# Patient Record
Sex: FEMALE | Race: BLACK OR AFRICAN AMERICAN | NOT HISPANIC OR LATINO | Employment: UNEMPLOYED | URBAN - METROPOLITAN AREA
[De-identification: names, ages, dates, MRNs, and addresses within clinical notes are randomized per-mention and may not be internally consistent; named-entity substitution may affect disease eponyms.]

---

## 2018-12-18 DIAGNOSIS — Z36.89 ENCOUNTER FOR FETAL ANATOMIC SURVEY: Primary | ICD-10-CM

## 2018-12-18 DIAGNOSIS — O35.9XX0 SUSPECTED FETAL ABNORMALITY AFFECTING MANAGEMENT OF MOTHER, SINGLE OR UNSPECIFIED FETUS: ICD-10-CM

## 2018-12-19 ENCOUNTER — OFFICE VISIT (OUTPATIENT)
Dept: MATERNAL FETAL MEDICINE | Facility: CLINIC | Age: 33
End: 2018-12-19
Payer: MEDICAID

## 2018-12-19 VITALS
HEIGHT: 59 IN | DIASTOLIC BLOOD PRESSURE: 64 MMHG | SYSTOLIC BLOOD PRESSURE: 108 MMHG | WEIGHT: 202 LBS | BODY MASS INDEX: 40.72 KG/M2

## 2018-12-19 DIAGNOSIS — O35.06X0 FETAL HYDROCEPHALY AFFECTING ANTEPARTUM CARE OF MOTHER, SINGLE OR UNSPECIFIED FETUS: ICD-10-CM

## 2018-12-19 DIAGNOSIS — O35.9XX0 SUSPECTED FETAL ABNORMALITY AFFECTING MANAGEMENT OF MOTHER, SINGLE OR UNSPECIFIED FETUS: ICD-10-CM

## 2018-12-19 DIAGNOSIS — Z36.89 ENCOUNTER FOR FETAL ANATOMIC SURVEY: ICD-10-CM

## 2018-12-19 DIAGNOSIS — Q02 MICROCEPHALY: ICD-10-CM

## 2018-12-19 PROCEDURE — 76819 FETAL BIOPHYS PROFIL W/O NST: CPT | Mod: ,,, | Performed by: OBSTETRICS & GYNECOLOGY

## 2018-12-19 PROCEDURE — 76811 OB US DETAILED SNGL FETUS: CPT | Mod: ,,, | Performed by: OBSTETRICS & GYNECOLOGY

## 2018-12-19 PROCEDURE — 99203 OFFICE O/P NEW LOW 30 MIN: CPT | Mod: TH,25,, | Performed by: OBSTETRICS & GYNECOLOGY

## 2018-12-19 NOTE — LETTER
December 19, 2018      Isaac Saxena MD  [id number]   [address] [place] [address]           [facility]  [address] , [address]  [place] [address]  Phone: [phone]          Patient: Liss Sewell   MR Number: [id number]   YOB: 1985   Date of Visit: 12/19/2018       Dear Dr. Isaac Saxena:    Thank you for referring Liss Sewell to me for evaluation. Attached you will find relevant portions of my assessment and plan of care.    If you have questions, please do not hesitate to call me. I look forward to following Liss Sewell along with you.    Sincerely,    Ben Sena MD    Enclosure  CC:  No Recipients    If you would like to receive this communication electronically, please contact [email or web address] or [phone] to request more information on kaleo Link access.    For providers and/or their staff who would like to refer a patient to [facility], please contact us through our one-stop-shop provider referral line, [facility], at [phone].    If you feel you have received this communication in error or would no longer like to receive these types of communications, please e-mail [email or web address]

## 2018-12-19 NOTE — PROGRESS NOTES
Chief complaint: Suspected microcephaly    Provider requesting consultation: Dr. Saxena    33 y.o. [id number] 36w1d EGA.    PMH:History reviewed. No pertinent past medical history.    PObHx:  OB History    Para Term  AB Living   3 1 1 0 1 1   SAB TAB Ectopic Multiple Live Births   1 0 0 0 1      # Outcome Date GA Lbr Ag/2nd Weight Sex Delivery Anes PTL Lv   3 Current            2 SAB  16w0d             Birth Comments: Reports going to the hosptial because of bleeding was not aware that she was pregnant   1 Term                   PSH:  Past Surgical History:   Procedure Laterality Date     SECTION  2017    FTP       Family history:family history is not on file.    Social history: reports that  has never smoked. she has never used smokeless tobacco. She reports that she does not drink alcohol or use drugs.    A detailed fetal anatomical ultrasound was completed today.  See details in imaging section of EPIC.    We discussed that hydrocephalus can be associated with chromosome abnormalities, viral infections, other genetic conditions, neural tube defects and other structural abnormalities. Chromosomes and aneuploidy were discussed in detail. The most common chromosome abnormality seen is typically Down syndrome.    'Ne discussed that there are many other genetic syndromes that cannot be diagnosed on ultrasound and that the fetus would need to be evaluated after birth. A thorough evaluation of the baby is recommended postnatally. This fetus is noted to be male. There is a genetic condition known as X-linked hydrocephalus that should be ruled out if no other diagnosis is established postnatally.     A neural tube defect is not noted on ultrasound. An obstruction in the brain is also a possibility. The baby should have an MRI  to rule out an obstruction. There is no reported family history of chromosome abnormalities, birth defects, mental retardation, multiple miscarriages or other  inherited conditions. Consanguinity was denied.    We discussed that individuals with hydrocephalus can have a normal outcome, however, the risk is increased for developmental delay, mental retardation, and seizures. Outcome is dependent on associated abnormalities and the underlying cause for the hydrocephalus.    Viral infections can also be screened for through polymerase chain reaction performed on amniotic fluid. The risks, benefits, and limitations of amniocentesis were discussed. There is a less than 1 in 1000 risk for complications following amniocentesis which at this GA would be PPROM and labor instead of fetal damage. These risks include vaginal leakage of fluid, vaginal bleeding, infection, rupture of membranes,, and damage to the cord or fetus. Results are approximately 99.9% accurate and are generally available within 10-14 days.    In addition, the fetus also evidences microcephaly, an unusual combination.  The absence of other anomalies could not be fully evaluated due to the limitations imposed by advanced GA.  The chance of this being 2/2a genetic or syndromic abnormality is very high.      She elected to proceed with amniocentesis and this will be scheduled. I also scheduled an MRI to better assess any other CNS abnormalities.     The patient was given an opportunity to ask questions about management and the diease process.  She expressed an understanding of and agreement to the above impression and plan. All questions were answered to her satisfaction.  She was given contact information to the [facility] clinic to address further concerns.      The approximate physician face-to-face time was 30 minutes. The majority of the time (>50%) was spent on counseling of the patient or coordination of care.

## 2018-12-20 DIAGNOSIS — O35.9XX0 KNOWN FETAL ANOMALY, ANTEPARTUM, SINGLE OR UNSPECIFIED FETUS: Primary | ICD-10-CM

## 2018-12-26 ENCOUNTER — HOSPITAL ENCOUNTER (OUTPATIENT)
Dept: RADIOLOGY | Facility: OTHER | Age: 33
Discharge: HOME | End: 2018-12-26
Attending: OBSTETRICS & GYNECOLOGY
Payer: MEDICAID

## 2018-12-26 ENCOUNTER — PROCEDURE VISIT (OUTPATIENT)
Dept: MATERNAL FETAL MEDICINE | Facility: CLINIC | Age: 33
End: 2018-12-26
Payer: MEDICAID

## 2018-12-26 ENCOUNTER — INITIAL CONSULT (OUTPATIENT)
Dept: MATERNAL FETAL MEDICINE | Facility: CLINIC | Age: 33
End: 2018-12-26
Payer: MEDICAID

## 2018-12-26 VITALS
DIASTOLIC BLOOD PRESSURE: 60 MMHG | SYSTOLIC BLOOD PRESSURE: 118 MMHG | WEIGHT: 203.25 LBS | BODY MASS INDEX: 41.05 KG/M2

## 2018-12-26 DIAGNOSIS — O35.06X0 FETAL HYDROCEPHALY AFFECTING ANTEPARTUM CARE OF MOTHER, SINGLE OR UNSPECIFIED FETUS: ICD-10-CM

## 2018-12-26 DIAGNOSIS — O35.9XX0 KNOWN FETAL ANOMALY, ANTEPARTUM, SINGLE OR UNSPECIFIED FETUS: ICD-10-CM

## 2018-12-26 PROCEDURE — 99215 OFFICE O/P EST HI 40 MIN: CPT | Mod: S$PBB,TH,25, | Performed by: OBSTETRICS & GYNECOLOGY

## 2018-12-26 PROCEDURE — 99999 PR PBB SHADOW E&M-EST. PATIENT-LVL II: CPT | Mod: PBBFAC,,, | Performed by: OBSTETRICS & GYNECOLOGY

## 2018-12-26 PROCEDURE — 76817 TRANSVAGINAL US OBSTETRIC: CPT | Mod: PBBFAC | Performed by: OBSTETRICS & GYNECOLOGY

## 2018-12-26 PROCEDURE — 74712 MRI FETAL SNGL/1ST GESTATION: CPT | Mod: 26,,, | Performed by: RADIOLOGY

## 2018-12-26 PROCEDURE — 74712 MRI FETAL SNGL/1ST GESTATION: CPT | Mod: TC

## 2018-12-26 PROCEDURE — 76816 OB US FOLLOW-UP PER FETUS: CPT | Mod: 26,S$PBB,, | Performed by: OBSTETRICS & GYNECOLOGY

## 2018-12-26 PROCEDURE — 99212 OFFICE O/P EST SF 10 MIN: CPT | Mod: PBBFAC,25,TH | Performed by: OBSTETRICS & GYNECOLOGY

## 2018-12-26 PROCEDURE — 76817 TRANSVAGINAL US OBSTETRIC: CPT | Mod: 26,S$PBB,, | Performed by: OBSTETRICS & GYNECOLOGY

## 2018-12-26 PROCEDURE — 76816 OB US FOLLOW-UP PER FETUS: CPT | Mod: PBBFAC | Performed by: OBSTETRICS & GYNECOLOGY

## 2018-12-27 ENCOUNTER — TELEPHONE (OUTPATIENT)
Dept: MATERNAL FETAL MEDICINE | Facility: CLINIC | Age: 33
End: 2018-12-27

## 2018-12-27 NOTE — TELEPHONE ENCOUNTER
After talking to Dr. Saxena, Dr. Castillo states that both her and Dr. Saxena are okay with patient's repeat  between  through .    Nurse phoned patient and reviewed the above information. Patient states she is okay with delivery on 2019 at 7:00 am.    Nurse instructed patient to remain NPO starting at midnight. Patient is coming from [place], [place] and is instructed to arrive to [facility] 6th floor L&D around 5:30 am.    Patient verbalized understanding of all information received.    Copy of  instructions printed out and mailed to patient's address on file.

## 2018-12-27 NOTE — PROGRESS NOTES
"OB Ultrasound Report (see PDF version under imaging tab) and f/u consult note    Indication  ========    Follow Up Consultation: Amnio/Fetal Head; Ventriculomegaly; Microcephaly.    History  ======    Previous Outcomes   3  Para 1    Pregnancy History  ===============    Maternal Lab Tests  Genetic screening declined.      Maternal Assessment  ================    Weight 92 kg  Weight (lb) 203 lb  BP syst 118 mmHg  BP diast 60 mmHg    Method  ======    Transvaginal ultrasound examination, Transabdominal ultrasound examination, 2D Color Doppler, Voluson E10. View: Suboptimal view:  limited by late gestational age.    Pregnancy  =========    Guerrier pregnancy. Number of fetuses: 1.    Dating  ======    GA by "stated dating" 37 w + 1 d  ROSEMARY by "stated dating": 1/15/2019  Ultrasound examination on: 2018  GA by U/S based upon: BPD, HC  GA by U/S 25 w + 6 d  ROSEMARY by U/S: 2019  Assigned: Dating performed on 2018, based on the external assessment  Assigned GA 37 w + 1 d  Assigned ROSEMARY: 1/15/2019    General Evaluation  ===============    Cardiac activity: present.  bpm.  Fetal movements: visualized.  Presentation: cephalic.  Placenta:  Placental site: anterior, right.  Umbilical cord: Cord vessels: 3 vessel cord.  Amniotic fluid: MVP 8.5 cm. OLEG 16.4 cm. Q1 8.0 cm, Q2 0.0 cm, Q3 4.2 cm, Q4 4.2 cm.    Biophysical Profile  ===============    2: Fetal breathing movements  2: Gross body movements  2: Fetal tone  2: Amniotic fluid volume  : Biophysical profile score    Fetal Biometry  ===========    Fetal Biometry  BPD 66.2 mm 26w 5d Hadlock  OFD 75.9 mm 25w 0d Lizzy  .7 mm 24w 6d Hadlock  Calculated by: Hadlock (BPD-HC-AC-FL)  Cephalic index 0.87  MVP 8.5 cm  OLEG 16.4 cm   bpm  Head / Face / Neck   17.6 mm  Inner IOD 13.5 mm  Outer IOD 44.90 mm 28w 2d Lizzy  Rt Orbit 15.7 mm  Lt Orbit 13.5 mm    Fetal Anatomy  ===========    Lateral ventricles: details  Lt lateral ventricle: 17.6 " mm  Stomach: visualized  Bladder: visualized  Gender: female  Wants to know gender: yes    Consultation  ==========    Type: Abnormal Ultrasound Finding.  I spent 45 minutes on the consultation today with the patient, her boyfriend, her mother and her mother's  regarding findings  from today's ultrasound exam. More than 50% of the consultation was spent in face-to-face counseling and coordination of care.  Referring MD: Dr. Isaac Saxena    We reviewed the finding of severe fetal microcephaly and cerebral lateral ventricular dilation seen on today's exam and the last exam  on 18. Although imaging of the intracranial anatomy is limited, this degree of microcephaly is predictive of very poor  neurodevelopmental outcome, regardless of etiology. The possible causes for the microcephaly, namely genetic/chromosomal  abnormalities, congenital infection, in utero cerebral vascular disruption/injury, were reviewed. Options for further prenatal evaluation  of severe microcephaly were also discussed. Fetal MRI is planned for later today and should provide better imaging of the intracranial  anatomy and may help to arrive at an etiology. While amniocentesis can be done to assess for chromosomal abnormalities, due to late  gestational age and the limitations in prenatal ascertainment of abnormalities beyond the level determined by chromosomal microarray,   genetic assessment is more likely to be fruitful in terms of obtaining a comprehensive genetic diagnosis. Additonally, due to  late gestational age, results from amniocentesis are unlikely to be available prior to birth. Therefore, after our discussion, the patient  and her family decided not to pursue amniocentesis.    The patient denies any illnesses during pregnancy other than a UTI (GBS,treated with ampicillin), and she denies involvement in any  car accident or serious trauma. Both she and her boyfriend deny any known family histories of birth  defects (other than one maternal  uncle who was born missing part of one hand) or significant developmental delay. She denies any chronic illnesses or medication use  other than chronic sinus congestion. Per Dr. Sena's note, she denies use of tobacco, alcohol, or illicit drugs.  Her past OB history is notable for a prior LTCS done at term for failure to progress. The current pregnancy is dated by an early first  trimester ultrasound done in Radiology at [facility] on 18. The patient has had very limited prenatal care  during this pregnancy with only 2 prenatal care visits with Dr. Saxena (one early first trimester visit and the second visit on  at  35 weeks 1 day). Her only other obstetrical care visit was a hospital evaluation for vaginal bleeding on 18. At that time fetal size  was within range expected for earlier assignment of ROSEMARY, but the HC lagged EGA by ~1 week.  Due to severe fetal microcephaly and anticipated  morbidities, delivery at [facility] is recommended. Have discussed  recommendation for delivery at [facility] with Dr. Saxena, who is in agreement. Will arrange for repeat C/S at ~ 39  weeks.    Addendum: Results from the fetal MRI were discussed with the interpreting radiologist, Dr. Gipson. MRI shows microcephaly with lateral  ventricular dilation, agenesis of the corpus callosum, and a simplified gyral pattern. The ventriculomegaly was felt to be secondary to  brain atrophy rather than due to an obstructive process. There was no evidence of recent hemorrhage, and there were no signs of  recent focal injury, such as porencephaly/schizencephaly.    Impression  =========    Severe microcephaly is again noted (HC lags EGA by ~ 12 weeks). Visualization of the intracranial anatomy is very limited both by TA  and by TV scanning due to fetal head position and maternal obesity. The lateral ventricles are dilated, and an apparently intact  falx  cerebri is seen. The posterior fossa is poorly visualized. The orbital sizes are in the lower normal range, and both the inner orbital  distance and outer orbital distance are decreased.  The fetal profile is abnormal with a very flattened, sloping forehead secondary to severe microcephaly.    Recommendation  ==============    Could consider doing maternal titers for CMV, toxoplasmosis and testing for ZIKA; however, given late gestational age and no  anticipated change in management based on results, this evaluation can be deferred until after birth.

## 2019-01-08 ENCOUNTER — ANESTHESIA EVENT (OUTPATIENT)
Dept: OBSTETRICS AND GYNECOLOGY | Facility: OTHER | Age: 34
End: 2019-01-08
Payer: MEDICAID

## 2019-01-08 ENCOUNTER — RESEARCH ENCOUNTER (OUTPATIENT)
Dept: RESEARCH | Facility: HOSPITAL | Age: 34
End: 2019-01-08

## 2019-01-08 ENCOUNTER — HOSPITAL ENCOUNTER (INPATIENT)
Facility: OTHER | Age: 34
LOS: 3 days | Discharge: HOME | End: 2019-01-11
Attending: OBSTETRICS & GYNECOLOGY | Admitting: OBSTETRICS & GYNECOLOGY
Payer: MEDICAID

## 2019-01-08 ENCOUNTER — ANESTHESIA (OUTPATIENT)
Dept: OBSTETRICS AND GYNECOLOGY | Facility: OTHER | Age: 34
End: 2019-01-08
Payer: MEDICAID

## 2019-01-08 DIAGNOSIS — Z98.891 S/P CESAREAN SECTION: Primary | ICD-10-CM

## 2019-01-08 DIAGNOSIS — Z98.891 S/P CESAREAN SECTION: ICD-10-CM

## 2019-01-08 PROBLEM — O35.07X0 FETAL MICROCEPHALY AFFECTING ANTEPARTUM CARE OF MOTHER: Status: ACTIVE | Noted: 2018-12-19

## 2019-01-08 LAB
ABO + RH BLD: NORMAL
BASOPHILS # BLD AUTO: 0.01 K/UL
BASOPHILS NFR BLD: 0.2 %
BLD GP AB SCN CELLS X3 SERPL QL: NORMAL
DIFFERENTIAL METHOD: ABNORMAL
EOSINOPHIL # BLD AUTO: 0.1 K/UL
EOSINOPHIL NFR BLD: 1.7 %
ERYTHROCYTE [DISTWIDTH] IN BLOOD BY AUTOMATED COUNT: 13.5 %
HCT VFR BLD AUTO: 33.4 %
HGB BLD-MCNC: 11.1 G/DL
HIV 1+2 AB+HIV1 P24 AG SERPL QL IA: NEGATIVE
LYMPHOCYTES # BLD AUTO: 1.7 K/UL
LYMPHOCYTES NFR BLD: 28.4 %
MCH RBC QN AUTO: 28.8 PG
MCHC RBC AUTO-ENTMCNC: 33.2 G/DL
MCV RBC AUTO: 87 FL
MONOCYTES # BLD AUTO: 0.8 K/UL
MONOCYTES NFR BLD: 13.9 %
NEUTROPHILS # BLD AUTO: 3.3 K/UL
NEUTROPHILS NFR BLD: 55.6 %
PLATELET # BLD AUTO: 232 K/UL
PMV BLD AUTO: 11.7 FL
RBC # BLD AUTO: 3.86 M/UL
RPR SER QL: NORMAL
WBC # BLD AUTO: 5.96 K/UL

## 2019-01-08 PROCEDURE — 37000009 HC ANESTHESIA EA ADD 15 MINS: Performed by: OBSTETRICS & GYNECOLOGY

## 2019-01-08 PROCEDURE — 51702 INSERT TEMP BLADDER CATH: CPT

## 2019-01-08 PROCEDURE — 63600175 PHARM REV CODE 636 W HCPCS: Performed by: STUDENT IN AN ORGANIZED HEALTH CARE EDUCATION/TRAINING PROGRAM

## 2019-01-08 PROCEDURE — 25000003 PHARM REV CODE 250: Performed by: STUDENT IN AN ORGANIZED HEALTH CARE EDUCATION/TRAINING PROGRAM

## 2019-01-08 PROCEDURE — 37000008 HC ANESTHESIA 1ST 15 MINUTES: Performed by: OBSTETRICS & GYNECOLOGY

## 2019-01-08 PROCEDURE — 27200688 HC TRAY, SPINAL-HYPER/ ISOBARIC: Performed by: STUDENT IN AN ORGANIZED HEALTH CARE EDUCATION/TRAINING PROGRAM

## 2019-01-08 PROCEDURE — 59514 PR CESAREAN DELIVERY ONLY: ICD-10-PCS | Mod: ,,, | Performed by: OBSTETRICS & GYNECOLOGY

## 2019-01-08 PROCEDURE — 99499 UNLISTED E&M SERVICE: CPT | Mod: ,,, | Performed by: OBSTETRICS & GYNECOLOGY

## 2019-01-08 PROCEDURE — 36004724 HC OB OR TIME LEV III - 1ST 15 MIN: Performed by: OBSTETRICS & GYNECOLOGY

## 2019-01-08 PROCEDURE — 59514 CESAREAN DELIVERY ONLY: CPT | Mod: ,,, | Performed by: OBSTETRICS & GYNECOLOGY

## 2019-01-08 PROCEDURE — 86645 CMV ANTIBODY IGM: CPT

## 2019-01-08 PROCEDURE — 59514 CESAREAN DELIVERY ONLY: CPT | Mod: ,,, | Performed by: ANESTHESIOLOGY

## 2019-01-08 PROCEDURE — 86778 TOXOPLASMA ANTIBODY IGM: CPT

## 2019-01-08 PROCEDURE — S0028 INJECTION, FAMOTIDINE, 20 MG: HCPCS | Performed by: STUDENT IN AN ORGANIZED HEALTH CARE EDUCATION/TRAINING PROGRAM

## 2019-01-08 PROCEDURE — 85025 COMPLETE CBC W/AUTO DIFF WBC: CPT

## 2019-01-08 PROCEDURE — 86592 SYPHILIS TEST NON-TREP QUAL: CPT

## 2019-01-08 PROCEDURE — 86644 CMV ANTIBODY: CPT

## 2019-01-08 PROCEDURE — 36004725 HC OB OR TIME LEV III - EA ADD 15 MIN: Performed by: OBSTETRICS & GYNECOLOGY

## 2019-01-08 PROCEDURE — 99499 NO LOS: ICD-10-PCS | Mod: ,,, | Performed by: OBSTETRICS & GYNECOLOGY

## 2019-01-08 PROCEDURE — 86703 HIV-1/HIV-2 1 RESULT ANTBDY: CPT

## 2019-01-08 PROCEDURE — 86850 RBC ANTIBODY SCREEN: CPT

## 2019-01-08 PROCEDURE — 59514 PRA REAN DELIVERY ONLY: ICD-10-PCS | Mod: ,,, | Performed by: ANESTHESIOLOGY

## 2019-01-08 PROCEDURE — 11000001 HC ACUTE MED/SURG PRIVATE ROOM

## 2019-01-08 PROCEDURE — 71000039 HC RECOVERY, EACH ADD'L HOUR: Performed by: OBSTETRICS & GYNECOLOGY

## 2019-01-08 PROCEDURE — 86777 TOXOPLASMA ANTIBODY: CPT

## 2019-01-08 PROCEDURE — 71000033 HC RECOVERY, INTIAL HOUR: Performed by: OBSTETRICS & GYNECOLOGY

## 2019-01-08 PROCEDURE — 27200682 HC TRAY, EPIDURAL PEDIATRIC: Performed by: STUDENT IN AN ORGANIZED HEALTH CARE EDUCATION/TRAINING PROGRAM

## 2019-01-08 RX ORDER — DIPHENHYDRAMINE HCL 25 MG
25 CAPSULE ORAL EVERY 6 HOURS PRN
Status: COMPLETED | OUTPATIENT
Start: 2019-01-08 | End: 2019-01-08

## 2019-01-08 RX ORDER — BUPIVACAINE HYDROCHLORIDE 7.5 MG/ML
INJECTION, SOLUTION INTRASPINAL
Status: DISCONTINUED | OUTPATIENT
Start: 2019-01-08 | End: 2019-01-09

## 2019-01-08 RX ORDER — CEFAZOLIN SODIUM 2 G/50ML
2 SOLUTION INTRAVENOUS
Status: DISCONTINUED | OUTPATIENT
Start: 2019-01-08 | End: 2019-01-08

## 2019-01-08 RX ORDER — ACETAMINOPHEN 325 MG/1
650 TABLET ORAL EVERY 6 HOURS
Status: COMPLETED | OUTPATIENT
Start: 2019-01-08 | End: 2019-01-09

## 2019-01-08 RX ORDER — IBUPROFEN 600 MG/1
600 TABLET ORAL EVERY 6 HOURS
Status: DISCONTINUED | OUTPATIENT
Start: 2019-01-09 | End: 2019-01-08

## 2019-01-08 RX ORDER — SODIUM CHLORIDE, SODIUM LACTATE, POTASSIUM CHLORIDE, CALCIUM CHLORIDE 600; 310; 30; 20 MG/100ML; MG/100ML; MG/100ML; MG/100ML
INJECTION, SOLUTION INTRAVENOUS CONTINUOUS
Status: DISCONTINUED | OUTPATIENT
Start: 2019-01-08 | End: 2019-01-08

## 2019-01-08 RX ORDER — MUPIROCIN 20 MG/G
1 OINTMENT TOPICAL 2 TIMES DAILY PRN
Status: DISCONTINUED | OUTPATIENT
Start: 2019-01-08 | End: 2019-01-11 | Stop reason: HOSPADM

## 2019-01-08 RX ORDER — MISOPROSTOL 200 UG/1
800 TABLET ORAL
Status: DISCONTINUED | OUTPATIENT
Start: 2019-01-08 | End: 2019-01-08

## 2019-01-08 RX ORDER — OXYCODONE HYDROCHLORIDE 5 MG/1
10 TABLET ORAL EVERY 4 HOURS PRN
Status: DISCONTINUED | OUTPATIENT
Start: 2019-01-08 | End: 2019-01-09

## 2019-01-08 RX ORDER — OXYTOCIN 10 [USP'U]/ML
INJECTION, SOLUTION INTRAMUSCULAR; INTRAVENOUS
Status: DISCONTINUED | OUTPATIENT
Start: 2019-01-08 | End: 2019-01-09

## 2019-01-08 RX ORDER — PHENYLEPHRINE HYDROCHLORIDE 10 MG/ML
INJECTION INTRAVENOUS
Status: DISCONTINUED | OUTPATIENT
Start: 2019-01-08 | End: 2019-01-09

## 2019-01-08 RX ORDER — OXYCODONE HYDROCHLORIDE 5 MG/1
5 TABLET ORAL EVERY 4 HOURS PRN
Status: DISCONTINUED | OUTPATIENT
Start: 2019-01-08 | End: 2019-01-09

## 2019-01-08 RX ORDER — DIPHENHYDRAMINE HCL 25 MG
25 CAPSULE ORAL EVERY 4 HOURS PRN
Status: DISCONTINUED | OUTPATIENT
Start: 2019-01-09 | End: 2019-01-11 | Stop reason: HOSPADM

## 2019-01-08 RX ORDER — MUPIROCIN 20 MG/G
OINTMENT TOPICAL
Status: DISCONTINUED | OUTPATIENT
Start: 2019-01-08 | End: 2019-01-08

## 2019-01-08 RX ORDER — MORPHINE SULFATE 0.5 MG/ML
INJECTION, SOLUTION EPIDURAL; INTRATHECAL; INTRAVENOUS
Status: DISCONTINUED | OUTPATIENT
Start: 2019-01-08 | End: 2019-01-09

## 2019-01-08 RX ORDER — ONDANSETRON 2 MG/ML
INJECTION INTRAMUSCULAR; INTRAVENOUS
Status: DISCONTINUED | OUTPATIENT
Start: 2019-01-08 | End: 2019-01-09

## 2019-01-08 RX ORDER — HYDROCODONE BITARTRATE AND ACETAMINOPHEN 5; 325 MG/1; MG/1
1 TABLET ORAL EVERY 4 HOURS PRN
Status: DISCONTINUED | OUTPATIENT
Start: 2019-01-09 | End: 2019-01-09

## 2019-01-08 RX ORDER — FENTANYL CITRATE 50 UG/ML
INJECTION, SOLUTION INTRAMUSCULAR; INTRAVENOUS
Status: DISCONTINUED | OUTPATIENT
Start: 2019-01-08 | End: 2019-01-09

## 2019-01-08 RX ORDER — FAMOTIDINE 10 MG/ML
20 INJECTION INTRAVENOUS ONCE
Status: COMPLETED | OUTPATIENT
Start: 2019-01-08 | End: 2019-01-08

## 2019-01-08 RX ORDER — ONDANSETRON 2 MG/ML
4 INJECTION INTRAMUSCULAR; INTRAVENOUS EVERY 6 HOURS PRN
Status: DISCONTINUED | OUTPATIENT
Start: 2019-01-08 | End: 2019-01-11 | Stop reason: HOSPADM

## 2019-01-08 RX ORDER — SODIUM CITRATE AND CITRIC ACID MONOHYDRATE 334; 500 MG/5ML; MG/5ML
30 SOLUTION ORAL
Status: DISCONTINUED | OUTPATIENT
Start: 2019-01-08 | End: 2019-01-08

## 2019-01-08 RX ORDER — NALBUPHINE HYDROCHLORIDE 10 MG/ML
2.5 INJECTION, SOLUTION INTRAMUSCULAR; INTRAVENOUS; SUBCUTANEOUS ONCE
Status: COMPLETED | OUTPATIENT
Start: 2019-01-08 | End: 2019-01-08

## 2019-01-08 RX ORDER — OXYTOCIN/RINGER'S LACTATE 20/1000 ML
333 PLASTIC BAG, INJECTION (ML) INTRAVENOUS CONTINUOUS
Status: DISCONTINUED | OUTPATIENT
Start: 2019-01-08 | End: 2019-01-08

## 2019-01-08 RX ORDER — HYDROCODONE BITARTRATE AND ACETAMINOPHEN 10; 325 MG/1; MG/1
1 TABLET ORAL EVERY 4 HOURS PRN
Status: DISCONTINUED | OUTPATIENT
Start: 2019-01-09 | End: 2019-01-09

## 2019-01-08 RX ORDER — METOCLOPRAMIDE HYDROCHLORIDE 5 MG/ML
5 INJECTION INTRAMUSCULAR; INTRAVENOUS EVERY 6 HOURS PRN
Status: DISCONTINUED | OUTPATIENT
Start: 2019-01-08 | End: 2019-01-11 | Stop reason: HOSPADM

## 2019-01-08 RX ORDER — OXYTOCIN/RINGER'S LACTATE 20/1000 ML
41.65 PLASTIC BAG, INJECTION (ML) INTRAVENOUS CONTINUOUS
Status: ACTIVE | OUTPATIENT
Start: 2019-01-08 | End: 2019-01-08

## 2019-01-08 RX ORDER — DOCUSATE SODIUM 100 MG/1
200 CAPSULE, LIQUID FILLED ORAL 2 TIMES DAILY PRN
Status: DISCONTINUED | OUTPATIENT
Start: 2019-01-08 | End: 2019-01-11 | Stop reason: HOSPADM

## 2019-01-08 RX ORDER — OXYTOCIN/RINGER'S LACTATE 20/1000 ML
41.7 PLASTIC BAG, INJECTION (ML) INTRAVENOUS CONTINUOUS
Status: DISCONTINUED | OUTPATIENT
Start: 2019-01-08 | End: 2019-01-08

## 2019-01-08 RX ORDER — SIMETHICONE 80 MG
1 TABLET,CHEWABLE ORAL EVERY 6 HOURS PRN
Status: DISCONTINUED | OUTPATIENT
Start: 2019-01-08 | End: 2019-01-11 | Stop reason: HOSPADM

## 2019-01-08 RX ORDER — ONDANSETRON 8 MG/1
8 TABLET, ORALLY DISINTEGRATING ORAL EVERY 8 HOURS PRN
Status: DISCONTINUED | OUTPATIENT
Start: 2019-01-08 | End: 2019-01-11 | Stop reason: HOSPADM

## 2019-01-08 RX ORDER — ACETAMINOPHEN 10 MG/ML
INJECTION, SOLUTION INTRAVENOUS
Status: DISCONTINUED | OUTPATIENT
Start: 2019-01-08 | End: 2019-01-09

## 2019-01-08 RX ADMIN — PHENYLEPHRINE HYDROCHLORIDE 200 MCG: 10 INJECTION INTRAVENOUS at 07:01

## 2019-01-08 RX ADMIN — SODIUM CITRATE AND CITRIC ACID MONOHYDRATE 30 ML: 500; 334 SOLUTION ORAL at 06:01

## 2019-01-08 RX ADMIN — FAMOTIDINE 20 MG: 10 INJECTION, SOLUTION INTRAVENOUS at 06:01

## 2019-01-08 RX ADMIN — SODIUM CHLORIDE, SODIUM LACTATE, POTASSIUM CHLORIDE, AND CALCIUM CHLORIDE: 600; 310; 30; 20 INJECTION, SOLUTION INTRAVENOUS at 06:01

## 2019-01-08 RX ADMIN — SODIUM CHLORIDE, SODIUM LACTATE, POTASSIUM CHLORIDE, AND CALCIUM CHLORIDE 1000 ML: .6; .31; .03; .02 INJECTION, SOLUTION INTRAVENOUS at 06:01

## 2019-01-08 RX ADMIN — OXYTOCIN 4 UNITS: 10 INJECTION, SOLUTION INTRAMUSCULAR; INTRAVENOUS at 08:01

## 2019-01-08 RX ADMIN — OXYCODONE HYDROCHLORIDE 10 MG: 5 TABLET ORAL at 11:01

## 2019-01-08 RX ADMIN — DIPHENHYDRAMINE HYDROCHLORIDE 25 MG: 25 CAPSULE ORAL at 09:01

## 2019-01-08 RX ADMIN — NALBUPHINE HYDROCHLORIDE 2.5 MG: 10 INJECTION, SOLUTION INTRAMUSCULAR; INTRAVENOUS; SUBCUTANEOUS at 10:01

## 2019-01-08 RX ADMIN — ACETAMINOPHEN 1000 MG: 10 INJECTION, SOLUTION INTRAVENOUS at 07:01

## 2019-01-08 RX ADMIN — FENTANYL CITRATE 10 MCG: 50 INJECTION, SOLUTION INTRAMUSCULAR; INTRAVENOUS at 07:01

## 2019-01-08 RX ADMIN — DIPHENHYDRAMINE HYDROCHLORIDE 25 MG: 25 CAPSULE ORAL at 08:01

## 2019-01-08 RX ADMIN — OXYTOCIN 2 UNITS: 10 INJECTION, SOLUTION INTRAMUSCULAR; INTRAVENOUS at 08:01

## 2019-01-08 RX ADMIN — OXYCODONE HYDROCHLORIDE 10 MG: 5 TABLET ORAL at 05:01

## 2019-01-08 RX ADMIN — ACETAMINOPHEN 650 MG: 325 TABLET ORAL at 01:01

## 2019-01-08 RX ADMIN — OXYTOCIN 4 UNITS: 10 INJECTION, SOLUTION INTRAMUSCULAR; INTRAVENOUS at 07:01

## 2019-01-08 RX ADMIN — Medication 0.15 MG: at 07:01

## 2019-01-08 RX ADMIN — BUPIVACAINE HYDROCHLORIDE IN DEXTROSE 1.4 ML: 7.5 INJECTION, SOLUTION SUBARACHNOID at 07:01

## 2019-01-08 RX ADMIN — SODIUM CHLORIDE, SODIUM LACTATE, POTASSIUM CHLORIDE, AND CALCIUM CHLORIDE: 600; 310; 30; 20 INJECTION, SOLUTION INTRAVENOUS at 07:01

## 2019-01-08 RX ADMIN — OXYCODONE HYDROCHLORIDE 10 MG: 5 TABLET ORAL at 09:01

## 2019-01-08 RX ADMIN — ONDANSETRON 4 MG: 2 INJECTION INTRAMUSCULAR; INTRAVENOUS at 07:01

## 2019-01-08 RX ADMIN — DOCUSATE SODIUM 200 MG: 100 CAPSULE, LIQUID FILLED ORAL at 08:01

## 2019-01-08 RX ADMIN — PHENYLEPHRINE HYDROCHLORIDE 100 MCG: 10 INJECTION INTRAVENOUS at 07:01

## 2019-01-08 RX ADMIN — DEXTROSE 2 G: 50 INJECTION, SOLUTION INTRAVENOUS at 06:01

## 2019-01-08 RX ADMIN — ACETAMINOPHEN 650 MG: 325 TABLET ORAL at 08:01

## 2019-01-08 NOTE — L&D DELIVERY NOTE
[facility]   Section   Operative Note    SUMMARY      Section Procedure Note    Procedure:   1. Repeat  Section via Pfannenstiel skin incision    Indications:   1. History of prior     Pre-operative Diagnosis:   1. IUP at 39 week 0 day pregnancy  2. History of   3. Fetal microcephaly    Post-operative Diagnosis:   same    Surgeon: Mariaelena Starks MD    Assistants: Luisa Burk MD - PGY4                      Colleen Petty MD - PGY1    Anesthesia: Epidural anesthesia    Findings:    1. Single viable  female infant, with APGARS 8/8, weight 2420g.   2. Normal appearing uterus, ovaries, and fallopian tubes.  3. Normal placenta    Estimated Blood Loss:  460 mL           Total IV Fluids: 2650 mL     UOP: 125 mL    Specimens: Placenta    PreOp CBC:   Lab Results   Component Value Date    WBC 5.96 2019    HGB 11.1 (L) 2019    HCT 33.4 (L) 2019    MCV 87 2019     2019                     Complications:  None; patient tolerated the procedure well.           Disposition: PACU - hemodynamically stable.           Condition: stable    Procedure Details   The patient was seen in the Holding Room. The risks, benefits, complications, treatment options, and expected outcomes were discussed with the patient.  The patient concurred with the proposed plan, giving informed consent.  The patient was taken to Operating Room, identified as Liss Sewell and the procedure verified as  Delivery. A Time Out was held and the above information confirmed.    After induction of anesthesia, the patient was prepped and draped in the usual sterile manner while placed in a dorsal supine position with a left lateral tilt.  A doss catheter was also placed per nursing. Preoperative antibiotics Ancef 2 g was administered. An allis test was performed confirming adequate anesthesia.  A Pfannenstiel incision was made and carried down through  the subcutaneous tissue to the fascia. Fascial incision was made and extended transversely. The fascia was grasped with [facility] clamps and  from the underlying rectus tissue superiorly and inferiorly. The peritoneum was identified, found to be free of adherent bowel, and entered bluntly. Peritoneal incision was extended longitudinally. The vesico-uterine peritoneum was identified, and bladder blade was inserted. The vesico-uterine peritoneum was incised transversely and the bladder flap was bluntly freed from the lower uterine segment. The bladder blade was reinserted to keep the bladder out of the operative field. A low transverse uterine incision was made with knife and extended with finger fracture. The amniotic sac was ruptured with a hemostat and the infant was noted to be in cephalic presentation. The fetal head was brought to the incision and elevated out of the pelvis. The patient delivered a single viable female infant without difficulty.  Infant weighed 2420 grams with Apgar scores of 8/8 at one and five minutes respectively. After the umbilical cord was clamped and cut, cord blood was obtained for evaluation. The placenta was removed intact, appeared normal, and was sent for pathology. The uterus was exteriorized. The uterine incision was closed with running locked sutures of chromic. Hemostasis was observed. The uterine outline, tubes and ovaries appeared normal. The uterus was returned to the abdominal cavity. Incision was reinspected and good hemostasis was noted. The fascia was then reapproximated with running sutures of PDS. The subcutaneous fat was reapproximated with chromic, and skin was reapproximated with 4-0 monocryl. The prevena device was placed without difficulty and a good seal was achieved.    Instrument, sponge, and needle counts were correct prior the abdominal closure and at the conclusion of the case.     Pt tolerated procedure well and was in stable condition after the  procedure.      Colleen Petty MD  OBGYN PGY-1           Delivery Information for  Girl Liss Sewell    Birth information:  YOB: 2018   Time of birth: 7:36 AM   Sex: female   Head Delivery Date/Time: 2019  7:36 AM   Delivery type: , Low Transverse   Gestational Age: 39w0d    Delivery Providers    Delivering clinician:  Mariaelena Starks MD   Provider Role    MD Colleen Newman MD Amanda C Lagarde, BORIS Aceves             Measurements    Weight:  2420 g  Length:           Apgars    Living status:  Living  Apgars:   1 min.:   5 min.:   10 min.:   15 min.:   20 min.:     Skin color:   0  0       Heart rate:   2  2       Reflex irritability:   2  2       Muscle tone:   2  2       Respiratory effort:   2  2       Total:   8  8       Apgars assigned by:  NICU         Operative Delivery    Forceps attempted?:  No  Vacuum extractor attempted?:  No         Shoulder Dystocia    Shoulder dystocia present?:  No           Presentation    Presentation:  Vertex  Position:  Middle Occiput Posterior           Interventions/Resuscitation    Method:  NICU Attended       Cord    Vessels:  3 vessels  Complications:  None  Delayed Cord Clamping?:  No  Cord Clamped Date/Time:  2019  7:36 AM  Cord Blood Disposition:  Sent with Baby  Gases Sent?:  No  Stem Cell Collection (by MD):  No       Placenta    Placenta delivery date/time:  2019 0736  Placenta removal:  Manual removal  Placenta appearance:  Intact  Placenta disposition:  pathology           Labor Events:       labor:       Labor Onset Date/Time:         Dilation Complete Date/Time:         Start Pushing Date/Time:       Rupture Date/Time:              Rupture type:           Fluid Amount:        Fluid Color:        Fluid Odor:        Membrane Status (PeriCalm):        Rupture Date/Time (PeriCalm):        Fluid Amount (PeriCalm):        Fluid Color (PeriCalm):         steroids:        Antibiotics given for GBS: No     Induction: none     Indications for induction:        Augmentation:       Indications for augmentation:       Labor complications: None     Additional complications:          Cervical ripening:                     Delivery:      Episiotomy:       Indication for Episiotomy:       Perineal Lacerations:   Repaired:      Periurethral Laceration:   Repaired:     Labial Laceration:   Repaired:     Sulcus Laceration:   Repaired:     Vaginal Laceration:   Repaired:     Cervical Laceration:   Repaired:     Repair suture:       Repair # of packets: 8     Vaginal delivery QBL (mL): 0      QBL (mL): 460     Combined Blood Loss (mL): 460     Vaginal Sweep Performed:       Surgicount Correct:         Other providers:       Anesthesia    Method:  Epidural          Details (if applicable):  Trial of Labor No    Categorization: Repeat    Priority: Routine   Indications for : Known/Suspected Fetal Anomaly;Repeat Section   Incision Type: low transverse     Additional  information:  Forceps:    Vacuum:    Breech:    Observed anomalies    Other (Comments):

## 2019-01-08 NOTE — ANESTHESIA PREPROCEDURE EVALUATION
2019  Liss Sewell is a 33 y.o., female.    Liss Sewell is a 33 y.o. female  @ 39w0d here for C/S 2/2 prior C/S and fetal complications of microcephaly and hydrocephalus.       Patient reports history of childhood asthma last hospitalization approximately 14 days ago and GERD well controlled with Tums.  Patient denies vaginal bleeding, LOF,  bleeding disorders, blood thinners, or spinal pathology.    Patient denies any problems with spinal anesthesia during her first  section.     OB History    Para Term  AB Living   3 1 1 0 1 1   SAB TAB Ectopic Multiple Live Births   1 0 0 0 1      # Outcome Date GA Lbr Ag/2nd Weight Sex Delivery Anes PTL Lv   3 Current            2 SAB  16w0d             Birth Comments: Reports going to the hosptial because of bleeding was not aware that she was pregnant   1 Term                   Wt Readings from Last 1 Encounters:   18 1305 92.2 kg (203 lb 4.2 oz)       BP Readings from Last 3 Encounters:   18 118/60   18 108/64       Patient Active Problem List   Diagnosis    Hydrocephaly, fetal, affecting care of mother, antepartum    Microcephaly       Past Surgical History:   Procedure Laterality Date     SECTION  2017    FTP       Social History     Socioeconomic History    Marital status: Single     Spouse name: Not on file    Number of children: Not on file    Years of education: Not on file    Highest education level: Not on file   Social Needs    Financial resource strain: Not on file    Food insecurity - worry: Not on file    Food insecurity - inability: Not on file    Transportation needs - medical: Not on file    Transportation needs - non-medical: Not on file   Occupational History    Not on file   Tobacco Use    Smoking status: Never Smoker    Smokeless tobacco: Never Used   Substance and  Sexual Activity    Alcohol use: No     Frequency: Never    Drug use: No    Sexual activity: Not on file   Other Topics Concern    Not on file   Social History Narrative    Not on file         Chemistry    No results found for: NA, K, CL, CO2, BUN, CREATININE, GLU No results found for: CALCIUM, ALKPHOS, AST, ALT, BILITOT, ESTGFRAFRICA, EGFRNONAA         No results found for: WBC, HGB, HCT, MCV, PLT    No results for input(s): PT, INR, PROTIME, APTT in the last 72 hours.        Anesthesia Evaluation    I have reviewed the Patient Summary Reports.    I have reviewed the Nursing Notes.   I have reviewed the Medications.     Review of Systems  Anesthesia Hx:  Denies Hx of Anesthetic complications  History of prior surgery of interest to airway management or planning: Previous anesthesia: Spinal Denies Family Hx of Anesthesia complications.   Denies Personal Hx of Anesthesia complications.   Social:  Non-Smoker, No Alcohol Use    Hematology/Oncology:     Oncology Normal    -- Denies Anemia:   EENT/Dental:EENT/Dental Normal   Cardiovascular:   Exercise tolerance: good Denies Hypertension.     Pulmonary:   Denies COPD. Asthma asymptomatic    Renal/:  Renal/ Normal     Hepatic/GI:   GERD, well controlled    Musculoskeletal:  Musculoskeletal Normal    Neurological:  Neurology Normal Denies TIA. Denies Seizures.    Endocrine:   Denies Diabetes. Denies Hypothyroidism.    Psych:  Psychiatric Normal           Physical Exam  General:  Obesity    Airway/Jaw/Neck:  Airway Findings: Mouth Opening: Normal Tongue: Normal  General Airway Assessment: Adult  Mallampati: III  TM Distance: Normal, at least 6 cm  Jaw/Neck Findings:     Neck ROM: Normal ROM  Neck Findings:  Girth Increased     Eyes/Ears/Nose:  Eyes/Ears/Nose Findings: EOMI, mucus membranes moist    Dental:  Dental Findings: In tact   Chest/Lungs:  Chest/Lungs Findings: Clear to auscultation, Normal Respiratory Rate     Heart/Vascular:  Heart Findings: Rate: Normal   Rhythm: Regular Rhythm  Heart murmur: negative    Abdomen:  Abdomen Findings: Normal    Musculoskeletal:  Musculoskeletal Findings: Normal   Skin:  Skin Findings: Normal    Mental Status:  Mental Status Findings:  Cooperative, Alert and Oriented         Anesthesia Plan  Type of Anesthesia, risks & benefits discussed:  Anesthesia Type:  CSE, epidural, general, spinal  Patient's Preference:   Intra-op Monitoring Plan: standard ASA monitors  Intra-op Monitoring Plan Comments:   Post Op Pain Control Plan: per primary service following discharge from PACU, IV/PO Opioids PRN and multimodal analgesia  Post Op Pain Control Plan Comments:   Induction:   IV  Beta Blocker:  Patient is not currently on a Beta-Blocker (No further documentation required).       Informed Consent: Patient understands risks and agrees with Anesthesia plan.  Questions answered. Anesthesia consent signed with patient.  ASA Score: 2     Day of Surgery Review of History & Physical:    H&P update referred to the surgeon.         Ready For Surgery From Anesthesia Perspective.

## 2019-01-08 NOTE — H&P
"   HISTORY AND PHYSICAL                                                OBSTETRICS          Subjective:       Liss Sewell is a 33 y.o.  female with IUP at 39w0d weeks gestation who presents for repeat  delivery.    Patient denies contractions, denies vaginal bleeding, denies LOF.   Fetal Movement: normal.    This IUP is complicated by severe fetal microcephaly, with head circumference lagging 12 weeks behind. 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  There is diffusely decreased supratentorial white matter and associated enlargement of the lateral ventricles.  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    Poor delineation of the gallbladder, raising the question underlying biliary atresia."    Review of Systems   Constitutional: Negative.  Negative for chills and fever.   Respiratory: Negative for cough and shortness of breath.    Gastrointestinal: Negative.  Negative for abdominal pain, bloating and blood in stool.   Genitourinary: Negative for dysuria, menstrual problem, pelvic pain, vaginal bleeding, vaginal discharge, vaginal pain, dysmenorrhea, urinary incontinence and vaginal odor.   Psychiatric/Behavioral: Negative for depression. The patient is not nervous/anxious.    Breast: Negative for mass, mastodynia, nipple discharge and skin changes    PMHx: No past medical history on file.    PSHx:   Past Surgical History:   Procedure Laterality Date     SECTION  2017    FTP       All:   Review of patient's allergies indicates:   Allergen Reactions    Aspirin Hives       Meds:   Medications Prior to Admission   Medication Sig Dispense Refill Last Dose    prenatal vit calc,iron,folic (PRENATAL VITAMIN ORAL) Take by mouth.   2019 at Unknown time       SH:   Social History     Socioeconomic History    Marital status: Single     Spouse name: Not on file    " "Number of children: Not on file    Years of education: Not on file    Highest education level: Not on file   Social Needs    Financial resource strain: Not on file    Food insecurity - worry: Not on file    Food insecurity - inability: Not on file    Transportation needs - medical: Not on file    Transportation needs - non-medical: Not on file   Occupational History    Not on file   Tobacco Use    Smoking status: Never Smoker    Smokeless tobacco: Never Used   Substance and Sexual Activity    Alcohol use: No     Frequency: Never    Drug use: No    Sexual activity: Not on file   Other Topics Concern    Not on file   Social History Narrative    Not on file       FH: No family history on file.    OBHx:   Obstetric History       T1      L1     SAB1   TAB0   Ectopic0   Multiple0   Live Births1       # Outcome Date GA Lbr Ag/2nd Weight Sex Delivery Anes PTL Lv   3 Current            2 2004 16w0d          1 Term                   Objective:       /64 (BP Location: Right arm, Patient Position: Sitting)   Pulse 83   Temp 98.6 °F (37 °C) (Oral)   Resp 18   Ht 4' 11" (1.499 m)   Wt 93.4 kg (206 lb)   SpO2 99%   BMI 41.61 kg/m²     Vitals:    19 0507 19 0512 19 0517 19 0522   BP:       BP Location:       Patient Position:       Pulse: 88 89 84 83   Resp:       Temp:       TempSrc:       SpO2: 99% 99% 99% 99%   Weight:       Height:           General:   alert, appears stated age and cooperative, no apparent distress   HENT:  normocephalic, atraumatic   Eyes:  extraocular movements and conjunctivae normal   Neck:  supple, range of motion normal, no thyromegaly   Lungs:   no respiratory distress   Heart:   regular rate   Abdomen:  soft, non-tender, non-distended but gravid, no rebound or guarding    Extremities negative edema, negative erythema   FHT: 130 BMP baseline, moderate BTBV, +accels, -decels;  Cat 1 (reassuring)                 TOCO: none "   Presentations: cephalic by ultrasound     EFW by Leopold's: 5.5 to 6    Lab Review  Blood Type Rh POS  GBBS: negative  Rubella: Immune  RPR: NR  HIV: negative  HepB: negative    Assessment:       39w0d weeks gestation with fetal microcephaly who presents for  delivery    Active Hospital Problems    Diagnosis  POA    *Indication for care in labor and delivery, antepartum [O75.9]  Yes    Hydrocephaly, fetal, affecting care of mother, antepartum [O35.0XX0]  Yes    Microcephaly [Q02]  Not Applicable      Resolved Hospital Problems   No resolved problems to display.          Plan:      Risks, benefits, alternatives and possible complications have been discussed in detail with the patient.   - Consents signed and to chart  - Admit to Labor and Delivery unit  - Epidural per Anesthesia  - Draw CBC, T&S, CMV, and toxo titers  - Ancef OCTOR  - To OR for C/S. Case Request is in.   - Notify Staff  - Ultrasound performed, infant in vertex position.   - Post-Partum Hemorrhage risk - low    NICU to be present in delivery due to fetal anomalies.   Prevena eligible - will discuss consents    Roberth Early M.D.  Obstetrics & Gynecology  PGY-2

## 2019-01-08 NOTE — PROGRESS NOTES
Prevena trial discussed in detail with patient, agrees to participate. Consents signed.  Randomized to device.    Luisa Burk MD  OBGYN - PGY 4

## 2019-01-08 NOTE — ANESTHESIA PROCEDURE NOTES
CSE    Patient location during procedure: OR  Start time: 1/8/2019 6:44 AM  Timeout: 1/8/2019 6:38 AM  End time: 1/8/2019 7:04 AM  Staffing  Anesthesiologist: Jenifer Giordano MD  Resident/CRNA: Duncan Spears MD  Other anesthesia staff: Solo Moreno MD  Performed: anesthesiologist   Preanesthetic Checklist  Completed: patient identified, site marked, surgical consent, pre-op evaluation, timeout performed, IV checked, risks and benefits discussed and monitors and equipment checked  CSE  Patient position: sitting  Prep: ChloraPrep and site prepped and draped  Patient monitoring: heart rate, cardiac monitor, continuous pulse ox and frequent blood pressure checks  Approach: midline  Spinal Needle  Needle type: pencil-tip   Needle length: 5 in  Epidural Needle  Injection technique: KAIA saline  Needle type: Tuohy   Needle gauge: 17 G  Needle length: 3.5 in  Needle insertion depth: 7 cm  Location: L4-5  Needle localization: anatomical landmarks  Catheter  Catheter type: springwound  Catheter size: 19 G  Catheter at skin depth: 12 cm  Assessment  Intrathecal Medications:  administered: primary anesthetic mcg of    Additional Notes  Converted to CSE 2/2 pt not tolerating spinal procedure. Hyperbaric bupiv 0.75% 1.4mL + fentanyl 10mcg + duramorph 150 mcg administered.

## 2019-01-08 NOTE — PROGRESS NOTES
Prevena study 2016.167    Ms. Sewell was admitted on today 2019 for delivery via repeat  section. She was approached and consented by Dr. Luisa Burk in L&D, who discussed the Prevena bandage study in detail, including the purpose, numbers/length, procedures, risk/benefit, cost/payment, contacts (investigators/IRB), alternatives/voluntary nature/withdrawal, confidentiality/HIPPA. Dr. Gomez was available for questions. Patient also did consent to data storage for future research.  The consent form was signed on today 2019 at 06;37 and randomized to Prevena Device and Patient was given a copy of signed informed consent. Prevena device was placed in OR per OR staff at 0845, and surgeon's approximation of depth of subcutaneous tissue was 2cm.

## 2019-01-09 VITALS — OXYGEN SATURATION: 100 % | DIASTOLIC BLOOD PRESSURE: 70 MMHG | SYSTOLIC BLOOD PRESSURE: 107 MMHG | HEART RATE: 73 BPM

## 2019-01-09 LAB
BASOPHILS # BLD AUTO: 0.01 K/UL
BASOPHILS NFR BLD: 0.1 %
CMV IGM SERPL IA-ACNC: <8 U/ML
DIFFERENTIAL METHOD: ABNORMAL
EOSINOPHIL # BLD AUTO: 0.1 K/UL
EOSINOPHIL NFR BLD: 1.1 %
ERYTHROCYTE [DISTWIDTH] IN BLOOD BY AUTOMATED COUNT: 13.6 %
HCT VFR BLD AUTO: 31.9 %
HGB BLD-MCNC: 10.5 G/DL
LYMPHOCYTES # BLD AUTO: 1.6 K/UL
LYMPHOCYTES NFR BLD: 22 %
MCH RBC QN AUTO: 28.6 PG
MCHC RBC AUTO-ENTMCNC: 32.9 G/DL
MCV RBC AUTO: 87 FL
MONOCYTES # BLD AUTO: 0.8 K/UL
MONOCYTES NFR BLD: 11.8 %
NEUTROPHILS # BLD AUTO: 4.6 K/UL
NEUTROPHILS NFR BLD: 64.9 %
PLATELET # BLD AUTO: 207 K/UL
PMV BLD AUTO: 11.3 FL
RBC # BLD AUTO: 3.67 M/UL
T GONDII IGG SER QL IA: NORMAL
T GONDII IGG SERPL IA-ACNC: <5 IU/ML
T GONDII IGM SER-ACNC: 3.4 AU/ML
WBC # BLD AUTO: 7.14 K/UL

## 2019-01-09 PROCEDURE — 99232 PR SUBSEQUENT HOSPITAL CARE,LEVL II: ICD-10-PCS | Mod: ,,, | Performed by: OBSTETRICS & GYNECOLOGY

## 2019-01-09 PROCEDURE — 85025 COMPLETE CBC W/AUTO DIFF WBC: CPT

## 2019-01-09 PROCEDURE — 25000003 PHARM REV CODE 250: Performed by: STUDENT IN AN ORGANIZED HEALTH CARE EDUCATION/TRAINING PROGRAM

## 2019-01-09 PROCEDURE — 99232 SBSQ HOSP IP/OBS MODERATE 35: CPT | Mod: ,,, | Performed by: OBSTETRICS & GYNECOLOGY

## 2019-01-09 PROCEDURE — 36415 COLL VENOUS BLD VENIPUNCTURE: CPT

## 2019-01-09 PROCEDURE — 11000001 HC ACUTE MED/SURG PRIVATE ROOM

## 2019-01-09 RX ORDER — OXYCODONE AND ACETAMINOPHEN 10; 325 MG/1; MG/1
1 TABLET ORAL EVERY 4 HOURS PRN
Status: DISCONTINUED | OUTPATIENT
Start: 2019-01-09 | End: 2019-01-11 | Stop reason: HOSPADM

## 2019-01-09 RX ORDER — OXYCODONE AND ACETAMINOPHEN 5; 325 MG/1; MG/1
1 TABLET ORAL EVERY 4 HOURS PRN
Status: DISCONTINUED | OUTPATIENT
Start: 2019-01-09 | End: 2019-01-11 | Stop reason: HOSPADM

## 2019-01-09 RX ADMIN — ACETAMINOPHEN 650 MG: 325 TABLET ORAL at 08:01

## 2019-01-09 RX ADMIN — DOCUSATE SODIUM 200 MG: 100 CAPSULE, LIQUID FILLED ORAL at 09:01

## 2019-01-09 RX ADMIN — OXYCODONE HYDROCHLORIDE AND ACETAMINOPHEN 1 TABLET: 10; 325 TABLET ORAL at 09:01

## 2019-01-09 RX ADMIN — OXYCODONE HYDROCHLORIDE AND ACETAMINOPHEN 1 TABLET: 10; 325 TABLET ORAL at 01:01

## 2019-01-09 RX ADMIN — ACETAMINOPHEN 650 MG: 325 TABLET ORAL at 02:01

## 2019-01-09 RX ADMIN — HYDROCODONE BITARTRATE AND ACETAMINOPHEN 1 TABLET: 10; 325 TABLET ORAL at 09:01

## 2019-01-09 RX ADMIN — OXYCODONE HYDROCHLORIDE 10 MG: 5 TABLET ORAL at 06:01

## 2019-01-09 RX ADMIN — OXYCODONE HYDROCHLORIDE AND ACETAMINOPHEN 1 TABLET: 10; 325 TABLET ORAL at 05:01

## 2019-01-09 RX ADMIN — OXYCODONE HYDROCHLORIDE 10 MG: 5 TABLET ORAL at 02:01

## 2019-01-09 RX ADMIN — SIMETHICONE CHEW TAB 80 MG 80 MG: 80 TABLET ORAL at 12:01

## 2019-01-09 NOTE — PROGRESS NOTES
POSTPARTUM PROGRESS NOTE     Liss Sewell is a 33 y.o. female POD #1 status post Repeat  section at 39w1d in a pregnancy complicated by severe fetal microcephaly and multiple other fetal anomalies at delivery.   Patient is doing well this morning. She denies nausea, vomiting, fever or chills.  Patient reports mild abdominal pain that is well relieved by oral pain medications. Lochia is mild. Patient is voiding without difficulty and ambulating with no difficulty. She has passed flatus, and has not had BM.  Patient plans to pump for baby in NICU. Will defer to primary ob in [place] for contraception.     Objective:       Temp:  [97.5 °F (36.4 °C)-98.5 °F (36.9 °C)] 98.1 °F (36.7 °C)  Pulse:  [68-90] 70  Resp:  [18] 18  SpO2:  [92 %-100 %] 100 %  BP: ()/(52-94) 97/56    General:   alert, appears stated age and cooperative   Lungs:   clear to auscultation bilaterally   Heart:   regular rate and rhythm, S1, S2 normal, no murmur, click, rub or gallop   Abdomen:  soft, non-tender; bowel sounds normal; no masses,  no organomegaly   Uterus:  firm located at the umblicus.        Incision: Prevena device in place and functioning; clean, dry and intact   Extremities: peripheral pulses normal, no pedal edema, no clubbing or cyanosis     Lab Review  No results found for this or any previous visit (from the past 4 hour(s)).    I/O    Intake/Output Summary (Last 24 hours) at 2019 0641  Last data filed at 2019 2200  Gross per 24 hour   Intake 2650 ml   Output 1910 ml   Net 740 ml        Assessment:     Patient Active Problem List   Diagnosis    Fetal microcephaly affecting antepartum care of mother    S/P Repeat  section    History of  section        Plan:   1. Postpartum care:  - Patient doing well. Continue routine management and advances.  - Continue PO pain meds. Pain well controlled.  - Heme: H/h 11.1/33.4 >. 10.5/31.9  - Encourage ambulation  - Contraception per primary ob   -  Rh status O pos     2. Fetal microcephaly/fetal anomaly   - CMV/toxo pending   - cytogenetic studies on placenta recommended; awaiting insurance/cost information         Dispo: As patient meets milestones, will plan to discharge POD#3-4.    Daja Villalobos MD  OBGYN, PGY-1

## 2019-01-09 NOTE — ANESTHESIA POSTPROCEDURE EVALUATION
"Anesthesia Post Evaluation    Patient: Liss Sewell    Procedure(s) Performed: Procedure(s) (LRB):   SECTION (N/A)    Final Anesthesia Type: spinal  Patient location during evaluation: labor & delivery  Patient participation: Yes- Able to Participate  Level of consciousness: awake and alert and oriented  Post-procedure vital signs: reviewed and stable  Pain management: adequate  Airway patency: patent  PONV status at discharge: No PONV  Anesthetic complications: no      Cardiovascular status: blood pressure returned to baseline  Respiratory status: room air, unassisted and spontaneous ventilation  Hydration status: euvolemic  Follow-up not needed.        Visit Vitals  /66   Pulse 83   Temp 36.8 °C (98.2 °F) (Oral)   Resp 18   Ht 4' 11" (1.499 m)   Wt 93.4 kg (206 lb)   SpO2 100%   Breastfeeding? Unknown   BMI 41.61 kg/m²       Pain/Darren Score: Pain Rating Prior to Med Admin: 7 (2019  5:12 PM)  Pain Rating Post Med Admin: 4 (2019  2:00 PM)        "

## 2019-01-09 NOTE — PLAN OF CARE
Pt and family will be followed through the NICU as pt's  was admitted to the NICU following delivery.    COPIED FROM INFANT'S CHART  __________________________________    SOCIAL WORK DISCHARGE PLANNING ASSESSMENT    Sw completed discharge planning assessment with pt's parents in mother's room 620.  Pt's parents were easily engaged. Education on the role of  was provided. Emotional support provided throughout assessment.    Sw completed demographic information and discharge planning with parents. Mom called her aunt, Ciara, so that she could hear information on resources. Sw discussed emergency lodging and meals request through medicaid, SSI benefits, [facility] and [facility]. Sw encouraged mom to call medicaid to request lodging and meals. Sw to also complete a [facility] referral for .       Legal Name: Severino Parisi (Donato or Jerome) :  2018    Patient Active Problem List   Diagnosis    Microcephaly    Ventriculomegaly of brain on fetal ultrasound    SGA (small for gestational age)    Poor feeding of          [facility]irth [facility]   ROSEMARY: 1/15/2018    Birth Weight: 2.42 kg (5 lb 5.4 oz)  Birth Length: 45.7cm  Gestational Age: 39w0d          Apgars    Living status:  Living  Apgars:   1 min.:   5 min.:   10 min.:   15 min.:   20 min.:     Skin color:   0  0       Heart rate:   2  2       Reflex irritability:   2  2       Muscle tone:   2  2       Respiratory effort:   2  2       Total:   8  8       Apgars assigned by:  NICU         Mother: Liss Sewell, age 33,  2018  Address: [facility] [place] [address]  Phone: [phone]  Employer: none    Job Title: none  Education: high school diploma       Father: Sascha Cano, age 28,  1990  Address: no stable address  Phone: none  Employer: none  Job Title: none  Education:  high school diploma  Signed Birth Certificate: undecided at the time of assessment.    Support  person(s): Myrna Powell (mgm) [phone]; Ciara Ward (aunt) [phone]    Sibling(s): Cori-2yo full sibling    Spiritual Affiliation: Yes  Hindu    [place] Medicaid: Primary: Yes Secondary: No   [facility]     Pediatrician: Mom unsure of name. Located on [address]???      Nutrition: Formula       WIC:   Mom already certified; will also apply for        Essential Items: (includes car seat, crib/bassinet/pack-n-play, clothing, bottles, diapers, etc.)  Acquired     Transportation: Personal vehicle and Medicaid transportation     Education: Information given on CPR classes and Physician/NNP daily rounds.     Resources Given:   [place] transportation, Immunizations,Glossary of Commonly Used Terms, SSI benefits, Preparing for Your Baby s Discharge Home, WIC, [facility], [facility] and [facility].      Potential Eligibility for SSI Benefits: Yes. Sw to provide diagnosis letter for application process.    Potential Discharge Needs:  [facility] and DME     Will follow.    Rosibel Blackwell LCSW  NICU   Ext. [phone]-phone  [email or web address]

## 2019-01-10 PROCEDURE — 88307 TISSUE EXAM BY PATHOLOGIST: CPT | Performed by: PATHOLOGY

## 2019-01-10 PROCEDURE — 11000001 HC ACUTE MED/SURG PRIVATE ROOM

## 2019-01-10 PROCEDURE — 99233 PR SUBSEQUENT HOSPITAL CARE,LEVL III: ICD-10-PCS | Mod: ,,, | Performed by: OBSTETRICS & GYNECOLOGY

## 2019-01-10 PROCEDURE — 88307 TISSUE EXAM BY PATHOLOGIST: CPT | Mod: 26,,, | Performed by: PATHOLOGY

## 2019-01-10 PROCEDURE — 99233 SBSQ HOSP IP/OBS HIGH 50: CPT | Mod: ,,, | Performed by: OBSTETRICS & GYNECOLOGY

## 2019-01-10 PROCEDURE — 25000003 PHARM REV CODE 250: Performed by: STUDENT IN AN ORGANIZED HEALTH CARE EDUCATION/TRAINING PROGRAM

## 2019-01-10 PROCEDURE — 88307 TISSUE SPECIMEN TO PATHOLOGY: ICD-10-PCS | Mod: 26,,, | Performed by: PATHOLOGY

## 2019-01-10 RX ORDER — DOCUSATE SODIUM 100 MG/1
200 CAPSULE, LIQUID FILLED ORAL 2 TIMES DAILY PRN
Qty: 40 CAPSULE | Refills: 0 | COMMUNITY
Start: 2019-01-10

## 2019-01-10 RX ORDER — OXYCODONE AND ACETAMINOPHEN 5; 325 MG/1; MG/1
1 TABLET ORAL EVERY 4 HOURS PRN
Qty: 30 TABLET | Refills: 0 | Status: SHIPPED | OUTPATIENT
Start: 2019-01-10

## 2019-01-10 RX ADMIN — OXYCODONE HYDROCHLORIDE AND ACETAMINOPHEN 1 TABLET: 10; 325 TABLET ORAL at 01:01

## 2019-01-10 RX ADMIN — DOCUSATE SODIUM 200 MG: 100 CAPSULE, LIQUID FILLED ORAL at 01:01

## 2019-01-10 RX ADMIN — OXYCODONE HYDROCHLORIDE AND ACETAMINOPHEN 1 TABLET: 10; 325 TABLET ORAL at 09:01

## 2019-01-10 RX ADMIN — OXYCODONE HYDROCHLORIDE AND ACETAMINOPHEN 1 TABLET: 10; 325 TABLET ORAL at 05:01

## 2019-01-10 NOTE — PROGRESS NOTES
POSTPARTUM PROGRESS NOTE     Liss Sewell is a 33 y.o. female POD #2 status post Repeat  section at 39w1d in a pregnancy complicated by severe fetal microcephaly and multiple other fetal anomalies at delivery.   Patient is doing well this morning. She denies nausea, vomiting, fever or chills.  Patient reports mild abdominal pain that is well relieved by oral pain medications. Lochia is mild. Patient is voiding without difficulty and ambulating with no difficulty. She has passed flatus, and has not had BM.  Patient plans to pump for baby in NICU. Will defer to primary ob in [place] for contraception.     Objective:       Temp:  [98.1 °F (36.7 °C)-98.7 °F (37.1 °C)] 98.1 °F (36.7 °C)  Pulse:  [83-86] 85  Resp:  [18] 18  SpO2:  [98 %-100 %] 100 %  BP: (117-127)/(66-70) 126/67    General:   alert, appears stated age and cooperative   Lungs:   clear to auscultation bilaterally   Heart:   regular rate and rhythm, S1, S2 normal, no murmur, click, rub or gallop   Abdomen:  soft, non-tender; bowel sounds normal; no masses,  no organomegaly   Uterus:  firm located at the umblicus.        Incision: Prevena device in place and functioning; clean, dry and intact   Extremities: peripheral pulses normal, no pedal edema, no clubbing or cyanosis     Lab Review  No results found for this or any previous visit (from the past 4 hour(s)).    I/O  No intake or output data in the 24 hours ending 01/10/19 0700     Assessment:     Patient Active Problem List   Diagnosis    Fetal microcephaly affecting antepartum care of mother    S/P Repeat  section    History of  section        Plan:   1. Postpartum care:  - Patient doing well. Continue routine management and advances.  - Continue PO pain meds. Pain well controlled.  - Heme: H/h 11.1/33.4 >. 10.5/31.9  - Encourage ambulation  - Contraception per primary ob     2. Fetal microcephaly/fetal anomaly   - CMV/toxo negative   - Cytogenetics pending    3.  Obesity  - BMI 41  - Encourage ambulation    Dispo: As patient meets milestones, will plan to discharge POD#3-4.      Colleen Petty MD  OBGYN PGY-1

## 2019-01-11 VITALS
OXYGEN SATURATION: 99 % | DIASTOLIC BLOOD PRESSURE: 58 MMHG | TEMPERATURE: 98 F | SYSTOLIC BLOOD PRESSURE: 102 MMHG | HEART RATE: 75 BPM | HEIGHT: 59 IN | BODY MASS INDEX: 41.53 KG/M2 | WEIGHT: 206 LBS | RESPIRATION RATE: 18 BRPM

## 2019-01-11 LAB — CMV IGG SERPL QL IA: REACTIVE

## 2019-01-11 PROCEDURE — 99238 HOSP IP/OBS DSCHRG MGMT 30/<: CPT | Mod: ,,, | Performed by: OBSTETRICS & GYNECOLOGY

## 2019-01-11 PROCEDURE — 25000003 PHARM REV CODE 250: Performed by: STUDENT IN AN ORGANIZED HEALTH CARE EDUCATION/TRAINING PROGRAM

## 2019-01-11 PROCEDURE — 99238 PR HOSPITAL DISCHARGE DAY,<30 MIN: ICD-10-PCS | Mod: ,,, | Performed by: OBSTETRICS & GYNECOLOGY

## 2019-01-11 RX ORDER — POLYETHYLENE GLYCOL 3350 17 G/17G
17 POWDER, FOR SOLUTION ORAL 2 TIMES DAILY PRN
Status: DISCONTINUED | OUTPATIENT
Start: 2019-01-11 | End: 2019-01-11 | Stop reason: HOSPADM

## 2019-01-11 RX ADMIN — OXYCODONE HYDROCHLORIDE AND ACETAMINOPHEN 1 TABLET: 10; 325 TABLET ORAL at 06:01

## 2019-01-11 RX ADMIN — OXYCODONE HYDROCHLORIDE AND ACETAMINOPHEN 1 TABLET: 10; 325 TABLET ORAL at 01:01

## 2019-01-11 RX ADMIN — OXYCODONE HYDROCHLORIDE AND ACETAMINOPHEN 1 TABLET: 10; 325 TABLET ORAL at 10:01

## 2019-01-11 RX ADMIN — POLYETHYLENE GLYCOL 3350 17 G: 17 POWDER, FOR SOLUTION ORAL at 03:01

## 2019-01-11 RX ADMIN — OXYCODONE HYDROCHLORIDE AND ACETAMINOPHEN 1 TABLET: 10; 325 TABLET ORAL at 02:01

## 2019-01-11 NOTE — DISCHARGE SUMMARY
Delivery Discharge Summary  Obstetrics      Primary OB Clinician: Isaac Saxena MD    Admission date: 2019  Discharge date: 2019    Disposition: To home, self care    Discharge Diagnosis List:      Patient Active Problem List   Diagnosis    Fetal microcephaly affecting antepartum care of mother    S/P Repeat  section    History of  section       Procedure: , due to history of prior  x1    Hospital Course:  Liss Sewell is a 33 y.o. now , POD #3 who was admitted on 2019 at 39w0d for scheduled repeat  section. Patient was subsequently admitted to labor and delivery unit with signed consents.     Please see delivery note for further details. Her postpartum course was uncomplicated. On discharge day, patient's pain is controlled with oral pain medications. Pt is tolerating ambulation without SOB or CP, and regular diet without N/V. Reports lochia is mild. Denies any HA, vision changes, F/C, LE swelling. Denies any breast pain/soreness.    Pt in stable condition and ready for discharge. She has been instructed to start and/or continue medications and follow up with her obstetrics provider as listed below.    Pertinent studies:  CBC  Recent Labs   Lab 19  0602 19  0452   WBC 5.96 7.14   HGB 11.1* 10.5*   HCT 33.4* 31.9*   MCV 87 87    207          There is no immunization history for the selected administration types on file for this patient.     Delivery:    Episiotomy:     Lacerations:     Repair suture:     Repair # of packets: 8   Blood loss (ml): 0     Birth information:  YOB: 2018   Time of birth: 7:36 AM   Sex: female   Delivery type: , Low Transverse   Gestational Age: 39w0d    Delivery Clinician:      Other providers:       Additional  information:  Forceps:    Vacuum:    Breech:    Observed anomalies      Living?:           APGARS  One minute Five minutes Ten minutes   Skin color:         Heart  rate:         Grimace:         Muscle tone:         Breathing:         Totals: 8  8        Placenta: Delivered:       appearance      Patient Instructions:   Current Discharge Medication List      START taking these medications    Details   docusate sodium (COLACE) 100 MG capsule Take 2 capsules (200 mg total) by mouth 2 (two) times daily as needed for Constipation.  Refills: 0      oxyCODONE-acetaminophen (PERCOCET) 5-325 mg per tablet Take 1 tablet by mouth every 4 (four) hours as needed.  Qty: 30 tablet, Refills: 0         CONTINUE these medications which have NOT CHANGED    Details   prenatal vit calc,iron,folic (PRENATAL VITAMIN ORAL) Take by mouth.             Discharge Procedure Orders   Diet Adult Regular     Other restrictions (specify):   Order Comments: Pelvic rest for at least 6 weeks. Nothing in vagina - no sex, tampons, or douching for 6 weeks     Notify your health care provider if you experience any of the following:   Order Comments: Heavy vaginal bleeding saturating more than 1 pad per hour for at least 2 hours.     Notify your health care provider if you experience any of the following:  increased confusion or weakness     Notify your health care provider if you experience any of the following:  persistent dizziness, light-headedness, or visual disturbances     Notify your health care provider if you experience any of the following:  severe persistent headache     Notify your health care provider if you experience any of the following:  difficulty breathing or increased cough     Notify your health care provider if you experience any of the following:  severe uncontrolled pain     Notify your health care provider if you experience any of the following:  persistent nausea and vomiting or diarrhea     Notify your health care provider if you experience any of the following:  temperature >100.4     Activity as tolerated       Follow-up Information     Isaac Saxena MD. Schedule an appointment as  soon as possible for a visit in 6 weeks.    Specialty:  Obstetrics  Why:  Postpartum visit  Contact information:  [facility]   [address] [place] [id number]  [phone]                    Daja Villalobos MD  OBGYN, PGY-1

## 2019-01-11 NOTE — PROGRESS NOTES
MD to bedside to remove prevena device. Vacuum turned off and adhesive removed. Incision appeared clean, dry and without drainage. Surrounding area showed no signs of irritation, skin breakdown or damage. Patient tolerated removal well.     Device disposed of appropriately.    Daja Villalobos MD  OBGYN, PGY-1

## 2019-01-11 NOTE — PLAN OF CARE
Problem: Adult Inpatient Plan of Care  Goal: Plan of Care Review  Outcome: Outcome(s) achieved Date Met: 01/11/19  Discharge instructions given to patient. Questions answered. Will visit the NICU and be discharged home.

## 2019-01-11 NOTE — PROGRESS NOTES
POSTPARTUM PROGRESS NOTE     Liss Sewell is a 33 y.o. female POD #3 status post Repeat  section at 39w1d in a pregnancy complicated by severe fetal microcephaly and multiple other fetal anomalies at delivery.     Patient is doing well this morning. She denies nausea, vomiting, fever or chills.  Patient reports mild abdominal pain that is well relieved by oral pain medications. Lochia is mild. Patient is voiding without difficulty and ambulating with no difficulty. She has passed flatus, and has had BM.    Yesterday required stool softeners and laxative to have bowel movement. Still with severe intermittent gas pain, but improved after bowel movement. She desires discharge tomorrow morning.     Patient plans to pump for baby in NICU. Will defer to primary ob in [place] for contraception.     Objective:       Temp:  [97.3 °F (36.3 °C)-98 °F (36.7 °C)] 97.8 °F (36.6 °C)  Pulse:  [75-93] 75  Resp:  [18] 18  SpO2:  [99 %-100 %] 99 %  BP: (102-122)/(58-92) 102/58    General:   alert, appears stated age and cooperative   Lungs:   clear to auscultation bilaterally   Heart:   regular rate and rhythm, S1, S2 normal, no murmur, click, rub or gallop   Abdomen:  soft, non-tender; bowel sounds normal; no masses,  no organomegaly   Uterus:  firm located at the umblicus.    Incision: Prevena device in place and functioning; clean, dry and intact   Extremities: peripheral pulses normal, no pedal edema, no clubbing or cyanosis     Lab Review  No results found for this or any previous visit (from the past 4 hour(s)).    I/O  No intake or output data in the 24 hours ending 19 0639     Assessment:     Patient Active Problem List   Diagnosis    Fetal microcephaly affecting antepartum care of mother    S/P Repeat  section    History of  section      Plan:   1. Postpartum care:  - Patient doing well. Continue routine management and advances.  - Continue PO pain meds. Pain well controlled.  - Heme:  H/h 11.1/33.4 > 10.5/31.9  - Encourage ambulation  - Contraception per primary ob   - Rh positive    2. Fetal microcephaly/fetal anomaly   - CMV/toxo negative   - Cytogenetics pending    3. Obesity  - BMI 41  - Encourage ambulation    Dispo: As patient meets milestones, will plan to discharge POD#4    Peri Scott MD, PhD  OBGYN, PGY-3

## 2019-01-15 ENCOUNTER — TELEPHONE (OUTPATIENT)
Dept: MATERNAL FETAL MEDICINE | Facility: CLINIC | Age: 34
End: 2019-01-15

## 2019-01-15 NOTE — TELEPHONE ENCOUNTER
Copy of both delivery summary and discharge summary faxed to patient's primary OB - Dr. Saxena - at [phone].    ----- Message from Brie Kimball sent at 1/15/2019  2:13 PM CST -----  Contact: Dr. Hemanth You office is calling for the delivery summary for Liss Sewell. [phone] is the fax. sh

## 2019-01-24 LAB
MISCELLANEOUS TEST NAME: NORMAL
REFERENCE LAB: NORMAL
SPECIMEN TYPE: NORMAL
TEST RESULT: NORMAL

## 2019-01-28 ENCOUNTER — LAB VISIT (OUTPATIENT)
Dept: LAB | Facility: HOSPITAL | Age: 34
End: 2019-01-28
Attending: PEDIATRICS
Payer: MEDICAID

## 2019-01-28 DIAGNOSIS — Z20.821 ZIKA VIRUS EXPOSURE: ICD-10-CM

## 2019-01-28 DIAGNOSIS — Z20.821 ZIKA VIRUS EXPOSURE: Primary | ICD-10-CM

## 2019-01-28 PROCEDURE — 87798 DETECT AGENT NOS DNA AMP: CPT

## 2019-01-28 PROCEDURE — 36415 COLL VENOUS BLD VENIPUNCTURE: CPT | Mod: PO

## 2019-01-28 PROCEDURE — 87798 DETECT AGENT NOS DNA AMP: CPT | Mod: 91

## 2019-01-31 LAB
HAS PATIENT BEEN SYMPTOMATIC?: NO
HAS PATIENT HAD ZIKA EXPOSURE?: YES
IMMUNOLOGIST REVIEW: NORMAL
IS PATIENT PREGNANT?: NO
ZIKA VIRUS PCR SERUM: NEGATIVE

## 2019-02-06 ENCOUNTER — TELEPHONE (OUTPATIENT)
Dept: MATERNAL FETAL MEDICINE | Facility: CLINIC | Age: 34
End: 2019-02-06

## 2019-02-06 LAB
HAS PATIENT BEEN SYMPTOMATIC?: NO
HAS PATIENT HAD ZIKA EXPOSURE?: YES
IMMUNOLOGIST REVIEW: NORMAL
PREGNANCY STATUS REPORTED: NO
ZIKV RNA SPEC QL NAA+PROBE: NEGATIVE

## 2019-02-06 NOTE — TELEPHONE ENCOUNTER
"Patient has been notified of Chromosome Analysis on Chorionic villi dissected from the specimen submitted. These results most likely reflect fetal rather than maternal karyotype.    Results: 46, XX  Female Karyotype    Pt reports that the baby "passed away last Tuesday." Discussed with patient that unfortunately this result does not give her any additional information but that this result will be with Dr. Saxena.    Pt verbalized understanding of information.     "

## 2019-02-08 ENCOUNTER — RESEARCH ENCOUNTER (OUTPATIENT)
Dept: RESEARCH | Facility: HOSPITAL | Age: 34
End: 2019-02-08

## 2019-02-08 NOTE — PROGRESS NOTES
"[facility]    Completed [facility] 30 day post partal survey with Ms. Sewell per telephone.  Rates pain a 2/10 on scale and rates satisfaction with incisional appearance a 8/10 and rates health status a 80/100.  Ms. Sewell stated she had experienced  "just a little pus" draining from side of incision about two weeks ago and was prescribed an antibiotic by Dr. Saxena in [place] and at her follow up appointment yesterday, physician told her incision is completely healed now.  Patient also states she experienced some depression related to infant death and was prescribed Prozac for this as well.    "

## 2020-11-18 ENCOUNTER — TELEPHONE (OUTPATIENT)
Dept: MATERNAL FETAL MEDICINE | Facility: CLINIC | Age: 35
End: 2020-11-18

## 2020-11-18 DIAGNOSIS — Z36.89 ENCOUNTER FOR FETAL ANATOMIC SURVEY: Primary | ICD-10-CM

## 2020-11-20 ENCOUNTER — TELEPHONE (OUTPATIENT)
Dept: MATERNAL FETAL MEDICINE | Facility: CLINIC | Age: 35
End: 2020-11-20

## 2020-12-14 ENCOUNTER — INITIAL CONSULT (OUTPATIENT)
Dept: MATERNAL FETAL MEDICINE | Facility: CLINIC | Age: 35
End: 2020-12-14
Payer: MEDICAID

## 2020-12-14 ENCOUNTER — TELEPHONE (OUTPATIENT)
Dept: GENETICS | Facility: CLINIC | Age: 35
End: 2020-12-14

## 2020-12-14 ENCOUNTER — PROCEDURE VISIT (OUTPATIENT)
Dept: MATERNAL FETAL MEDICINE | Facility: CLINIC | Age: 35
End: 2020-12-14
Payer: MEDICAID

## 2020-12-14 VITALS
WEIGHT: 187.81 LBS | SYSTOLIC BLOOD PRESSURE: 108 MMHG | DIASTOLIC BLOOD PRESSURE: 64 MMHG | BODY MASS INDEX: 37.94 KG/M2

## 2020-12-14 DIAGNOSIS — Z36.89 ENCOUNTER FOR FETAL ANATOMIC SURVEY: ICD-10-CM

## 2020-12-14 DIAGNOSIS — Z36.82 ENCOUNTER FOR ANTENATAL SCREENING FOR NUCHAL TRANSLUCENCY: ICD-10-CM

## 2020-12-14 DIAGNOSIS — Z87.798 HISTORY OF CONGENITAL ANOMALY: Primary | ICD-10-CM

## 2020-12-14 DIAGNOSIS — O30.042 DICHORIONIC DIAMNIOTIC TWIN PREGNANCY IN SECOND TRIMESTER: ICD-10-CM

## 2020-12-14 PROCEDURE — 99999 PR PBB SHADOW E&M-EST. PATIENT-LVL II: CPT | Mod: PBBFAC,,, | Performed by: OBSTETRICS & GYNECOLOGY

## 2020-12-14 PROCEDURE — 99212 OFFICE O/P EST SF 10 MIN: CPT | Mod: PBBFAC,TH,25 | Performed by: OBSTETRICS & GYNECOLOGY

## 2020-12-14 PROCEDURE — 99214 OFFICE O/P EST MOD 30 MIN: CPT | Mod: 25,S$PBB,TH, | Performed by: OBSTETRICS & GYNECOLOGY

## 2020-12-14 PROCEDURE — 76814 OB US NUCHAL MEAS ADD-ON: CPT | Mod: PBBFAC | Performed by: OBSTETRICS & GYNECOLOGY

## 2020-12-14 PROCEDURE — 76813 OB US NUCHAL MEAS 1 GEST: CPT | Mod: PBBFAC | Performed by: OBSTETRICS & GYNECOLOGY

## 2020-12-14 PROCEDURE — 76814 OB US NUCHAL MEAS ADD-ON: CPT | Mod: 26,S$PBB,, | Performed by: OBSTETRICS & GYNECOLOGY

## 2020-12-14 PROCEDURE — 76813 OB US NUCHAL MEAS 1 GEST: CPT | Mod: 26,S$PBB,, | Performed by: OBSTETRICS & GYNECOLOGY

## 2020-12-14 PROCEDURE — 76814 PR US, OB NUCHAL, TRANSABDOM/TRANSVAG, EA ADDL GESTATION: ICD-10-PCS | Mod: 26,S$PBB,, | Performed by: OBSTETRICS & GYNECOLOGY

## 2020-12-14 PROCEDURE — 99999 PR PBB SHADOW E&M-EST. PATIENT-LVL II: ICD-10-PCS | Mod: PBBFAC,,, | Performed by: OBSTETRICS & GYNECOLOGY

## 2020-12-14 PROCEDURE — 76813 PR US, OB NUCHAL, TRANSABDOM/TRANSVAG, FIRST GESTATION: ICD-10-PCS | Mod: 26,S$PBB,, | Performed by: OBSTETRICS & GYNECOLOGY

## 2020-12-14 PROCEDURE — 99214 PR OFFICE/OUTPT VISIT, EST, LEVL IV, 30-39 MIN: ICD-10-PCS | Mod: 25,S$PBB,TH, | Performed by: OBSTETRICS & GYNECOLOGY

## 2020-12-14 NOTE — LETTER
December 15, 2020      Isaac Saxena MD  [address] [place] [address]           [address]  [address] [facility] [address]  Phone: [phone]          Patient: iLss Sewell   MR Number: [id number]   YOB: 1985   Date of Visit: 12/14/2020       Dear Dr. Isaac Saxena:    Thank you for referring Liss Sewell to me for evaluation. Attached you will find relevant portions of my assessment and plan of care.    If you have questions, please do not hesitate to call me. I look forward to following Liss Sewell along with you.    Sincerely,    Corby Heredia MD    Enclosure  CC:  No Recipients    If you would like to receive this communication electronically, please contact [email or web address] or [phone] to request more information on Multichannel Link access.    For providers and/or their staff who would like to refer a patient to [facility], please contact us through our one-stop-shop provider referral line, [facility], at [phone].    If you feel you have received this communication in error or would no longer like to receive these types of communications, please e-mail [email or web address]

## 2020-12-14 NOTE — TELEPHONE ENCOUNTER
----- Message from Reanna Rodríguez MS sent at 12/14/2020  1:39 PM CST -----  Regarding: New referral  Hi, do you mind scheduling this patient to see me. Her [facility] doctor talked to her about it so she should be aware. Can be virtual if she would prefer. Maybe towards the end of the week or early next week? I think tomorrow and Wednesday will be too much. Thanks!

## 2020-12-15 NOTE — PROGRESS NOTES
MATERNAL-FETAL MEDICINE   CONSULT NOTE    SUBJECTIVE:     Ms. Liss Sewell is a 35 y.o.  female with IUP at 12w1d who is seen in consultation by MFM for evaluation and management of:  Prior pregnancy with fetal anomaly, dichorionic diamniotic twin IUP    Patient's prior pregnancy in 2019 was complicated by limited prenatal care and fetal anomalies that included hydrocephalus, absent corpus callosum, small vermis, severe microcephaly, and simplify gyral pattern.  Infant was small for gestational age as well.  The infant passed away 2019.  Initial workup for etiology was negative with CMV negative, toxo negative, Zika negative.  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  Further workup was limited after the infant passed away.   This pregnancy was conceived naturally with the same partner as prior pregnancies.  Patient's 1st pregnancy was uncomplicated term, delivered by  due to failure to progress.    OB HX:  OB History        4    Para   2    Term   2       0    AB   1    Living   2       SAB   1    TAB   0    Ectopic   0    Multiple   0    Live Births   2                 No past medical history on file.  Past Surgical History:   Procedure Laterality Date     SECTION  2017    FTP     SECTION N/A 2019    Procedure:  SECTION;  Surgeon: Mariaelena Starks MD;  Location: [facility];  Service: OB/GYN;  Laterality: N/A;       Family history: negative for birth defects (other than above), recurrent miscarriages, chromosomal abnormalities.     Social History     Tobacco Use    Smoking status: Never Smoker    Smokeless tobacco: Never Used   Substance Use Topics    Alcohol use: No     Frequency: Never    Drug use: No     Review of patient's allergies indicates:   Allergen Reactions    Aspirin Hives    Nsaids (non-steroidal anti-inflammatory drug)  Hives     Medications:  PNV    Aneuploidy screening:   N/A    Records reviewed    Care team members:  Hemanth - Primary OB provider     OBJECTIVE:     Blood Pressure:   Vitals:    20 1039   BP: 108/64     Weight: 85.2kg    Physical Exam:  deferred    Ultrasound performed. See viewpoint for full ultrasound report.    Significant labs/imaging:  Per above    ASSESSMENT/PLAN:     35 y.o.  female with IUP at 12w2d     PRIOR PREGNANCY WITH FETAL ANOMALIES  The prior pregnancy was complicated with fetal anomalies per above.  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   I counseled the patient regarding these findings.  Genetic findings do not seem to correlate with the anatomical anomalies that were found on the infant.  I believe that these were two independent events.  However, given that we do not have the exact etiology for the fetal anomalies in her prior pregnancy, I explained that it would be difficult for me to accurately predict the risk of recurrence in this pregnancy.  I suspected it was a random event, however, I am unable to verify this.   The genetic findings, nonetheless, warrant further workup and I offered the patient a genetic counseling session referral.    Recommendations:  - A genetic counselor referral was made today with Ms. Rodríguez who will contact patient to schedule in the near future  - Targeted ultrasound at 20 weeks will be performed.  This was scheduled today  - Optimization of prenatal care - healthy eating and appropriate dietary intake (see below), avoidance of smoking/EtOH  - MOD/timing/ testing per below.      DICHORIONIC/DIAMNIOTIC TWIN GESTATION  I counseled the patient with the risks associated with twin pregnancy. These include but are not limited to  labor and delivery, gestational diabetes, hypertensive disorders of pregnancy, fetal growth restriction and/or discordant twins, malpresentation, congenital anomalies,  postpartum hemorrhage and  delivery. The patient voiced understanding of these risks. We discussed that a twin gestation increases the risk of nearly all pregnancy related complications. We also discussed the increased risk of cerebral palsy in multiple gestations.   If discordancy (difference in EFW's > 20%) or other complications develop, more intense  fetal surveillance should be performed.   Mode of delivery is dependent on fetal presentation as well as the comfort of her providers with potential breech extraction.      Recommendations:   Detailed fetal anatomic survey per above.   Cervical length screening at anatomy scan.   Serial growth ultrasounds every 4 weeks starting at 28 weeks.    Weekly  testing to start at 32 weeks gestation.  This is to be scheduled by her primary OB provider at preferred location   Delivery at 38 weeks if otherwise undelivered, given the increased risk of adverse outcomes with prolonged gestation.   Unable to start ASA due to allergies   Adequate iron (eg, 60 mg daily with adjustments based upon hemoglobin and ferritin concentrations) and folic acid (1000 mcg per day).   Would recommend early GDM screen.  This is to be performed at her primary OB providers office.   Will defer to primary OB provider for further aneuploidy screening (NT unsuccessful today).      The patient was given an opportunity to ask questions about the management and disease process above  She expressed an understanding of and agreement to the above impression and plan. All questions were answered to her satisfaction.  She is aware of the contact information to the MFM clinic to address further concerns.      Time spent in consultation today: 45 min, >50% of which was face-to-face time with the patient.        Corby Heredia MD   Maternal-Fetal Medicine

## 2020-12-18 ENCOUNTER — TELEPHONE (OUTPATIENT)
Dept: GENETICS | Facility: CLINIC | Age: 35
End: 2020-12-18

## 2020-12-21 ENCOUNTER — OFFICE VISIT (OUTPATIENT)
Dept: GENETICS | Facility: CLINIC | Age: 35
End: 2020-12-21
Payer: MEDICAID

## 2020-12-21 DIAGNOSIS — O35.07X0 FETAL MICROCEPHALY AFFECTING ANTEPARTUM CARE OF MOTHER, SINGLE OR UNSPECIFIED FETUS: ICD-10-CM

## 2020-12-21 PROCEDURE — 99499 NO LOS: ICD-10-PCS | Mod: GT,,, | Performed by: MEDICAL GENETICS

## 2020-12-21 PROCEDURE — 96040 PR GENETIC COUNSELING, EACH 30 MIN: CPT | Mod: GT,,, | Performed by: GENETIC COUNSELOR, MS

## 2020-12-21 PROCEDURE — 96040 PR GENETIC COUNSELING, EACH 30 MIN: ICD-10-PCS | Mod: GT,,, | Performed by: GENETIC COUNSELOR, MS

## 2020-12-21 PROCEDURE — 99499 UNLISTED E&M SERVICE: CPT | Mod: GT,,, | Performed by: MEDICAL GENETICS

## 2020-12-21 NOTE — PROGRESS NOTES
Liss Sewell         DOS: 2020   : 1985   MRN: [id number]     TELEMEDICINE VIDEO VISIT     The patient location is:  Patient Home   The chief complaint leading to consultation is: previous pregnancy, multiple congenital anomalies  Total time spent with patient: 60 minutes with over 50% spent counseling.   Visit type: Virtual visit with synchronous audio and video; switched to audio only due to technical problems  Patients state location: [place]    Each patient to whom he or she provides medical services by telemedicine is:  (1) informed of the relationship between the physician and patient and the respective role of any other health care provider with respect to management of the patient; and (2) notified that he or she may decline to receive medical services by telemedicine and may withdraw from such care at any time.    REFERRING MD: Corby Heredia MD     REASON FOR CONSULT: Our Medical Genetic Service was asked to evaluate this  35-year-old female with dichorionic diamniotic twin IUP at 13w1d. Her previous pregnancy was complicated by fetal anomalies including hydrocephalus, absent corpus callosum, small vermis, severe microcephaly, and simplify gyral pattern. She presented unaccompanied, but her aunt came on the line during the visit.     PRESENT ILLNESS: Ms. Sewell is a 35-year-old  female currently 13w1d pregnant with dichorionic diamniotic twins. Her previous pregnancy in 2019 was complicated by fetal anomalies including hydrocephalus, absent corpus callosum, small vermis, severe microcephaly, and simplify gyral pattern. The infant was also SGA. Zika, CMV, and toxo were negative. 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 This result is further summarized below. The infant, Severino, passed away on January  2019.    PAST MEDICAL HISTORY:   S/P Repeat  section  History of  section  Fetal microcephaly affecting antepartum care of mother    FAMILY HISTORY: Ms. Sewell and her partner, Sascha have a 3-year-old daughter in good health with normal development. She has a 29-year-old sister with sickle cell trait but no other health problems. She does not have any children. Sascha is 29 and healthy. He has 4 siblings, but not much is known about their health. His mother  of AIDS at age 36. No information about his father is known. There is no immediate family history of learning disability, autism, birth defects, recurrent miscarriage, or early childhood death. Consanguinity was denied.     IMPRESSION: Ms. Sewell is a 35-year-old  female currently 13w1d pregnant with dichorionic diamniotic twins. Her previous pregnancy was complicated by severe microcephaly and several other anomalies including absent corpus callosum, small vermis, and SGA. A chromosomal microarray revealed mosaic monosomy 7. Monosomy 7 and mosaic monosomy 7 can be associated with monosomy 7 syndrome and although it can be sporadic or a congenital abnormality, it is often familial in nature. Monosomy 7 syndrome is characterized by early childhood onset of evidence of bone marrow insufficiency/failure associated with increased risk for myelodysplastic syndrome (MDS) or acute myelogenous leukemia (AML).     We discussed that there is limited information about monosomy 7 and congenital anomalies but that it may certainly have played a role. Partial monosomy 7p, a disorder characterized by a deletion of a portion of the p arm of chromosome 7 is associated with microcephaly, but also other physical features Severino did not have.    We discussed that [place] chromosome abnormality was most likely de jose antonio, but testing the parents would help confirm and provide information about recurrence risks. We discussed testing options during the  current pregnancy including an amniocentesis with targeted array, but Ms. Sewell would like to wait until after her anatomy scan before deciding to proceed with any testing. We discussed limitations for termination if desired.     Parental testing can also help determine risks associated with monsomy 7 syndrome, though it is reassuring for the parents that most cases of monosomy 7 syndrome have occurred in individuals under the age of 20. Ms. Boudreaux partner is currently incarcerated and unable to have testing. We discussed that a negative result for herself does not mean he is also negative, and does not rule out the possibility that her daughter is not at risk. She is unsure how long he will be incarcerated, but will plan to follow-up once more information is known.     I spent time providing psychosocial support surrounding the recent loss and new pregnancy.     RECOMMENDATIONS:  1. Parental testing for monosomy 7 (will put in orders for Ms. Sewell and for partner when he becomes available)  2. Amniocentesis and targeted array (or microarray if indicated/desired) - declined today   3. Follow-up as needed     REFERENCES: DERREK Garcia, RAIMUNDO Ruiz, RAIMUNDO Jhaveri et al. Monosomy 7 in myeloid malignancies: parental origin and monitoring by real-time quantitative PCR. Leukemia 21, 4488-8970 (2007). [email or web address]://doi.[email or web address]/sj.bell.5212827    TIME SPENT: 60 minutes with over 50% spent counseling.     Reanna Rodríguez, MPH, MS, formerly [facility]  Certified Genetic Counselor  [facility]     Arvind Simms M.D.                                                                            Section Head - Medical Genetics                                                                                       [facility]

## 2021-02-10 ENCOUNTER — PROCEDURE VISIT (OUTPATIENT)
Dept: MATERNAL FETAL MEDICINE | Facility: CLINIC | Age: 36
End: 2021-02-10
Payer: MEDICAID

## 2021-02-10 VITALS
WEIGHT: 190 LBS | TEMPERATURE: 98 F | DIASTOLIC BLOOD PRESSURE: 53 MMHG | SYSTOLIC BLOOD PRESSURE: 115 MMHG | BODY MASS INDEX: 38.38 KG/M2

## 2021-02-10 DIAGNOSIS — Z36.89 ENCOUNTER FOR ULTRASOUND TO ASSESS FETAL GROWTH: Primary | ICD-10-CM

## 2021-02-10 DIAGNOSIS — Z87.798 HISTORY OF CONGENITAL ANOMALY: ICD-10-CM

## 2021-02-10 DIAGNOSIS — Z36.89 ENCOUNTER FOR FETAL ANATOMIC SURVEY: ICD-10-CM

## 2021-02-10 DIAGNOSIS — O30.042 DICHORIONIC DIAMNIOTIC TWIN PREGNANCY IN SECOND TRIMESTER: ICD-10-CM

## 2021-02-10 PROCEDURE — 99499 NO LOS: ICD-10-PCS | Mod: ,,, | Performed by: OBSTETRICS & GYNECOLOGY

## 2021-02-10 PROCEDURE — 76805 PR US, OB 14+WKS, TRANSABD, SINGLE GESTATION: ICD-10-PCS | Mod: 59,,, | Performed by: OBSTETRICS & GYNECOLOGY

## 2021-02-10 PROCEDURE — 99499 UNLISTED E&M SERVICE: CPT | Mod: ,,, | Performed by: OBSTETRICS & GYNECOLOGY

## 2021-02-10 PROCEDURE — 76805 OB US >/= 14 WKS SNGL FETUS: CPT | Mod: ,,, | Performed by: OBSTETRICS & GYNECOLOGY

## 2021-02-10 RX ORDER — VITAMIN- MINERAL OMEGA-3 SUPPLEMENT 53.5; 38; 25; 1; 2; 3; 1.8; 5; 25; 300; 12.5; 2; 5; 10; 2 MG/1; MG/1; MG/1; MG/1; MG/1; MG/1; MG/1; MG/1; MG/1; UG/1; UG/1; MG/1; MG/1; MG/1; MG/1
CAPSULE, LIQUID FILLED ORAL
COMMUNITY
Start: 2021-01-25

## 2021-02-25 ENCOUNTER — TELEPHONE (OUTPATIENT)
Dept: MATERNAL FETAL MEDICINE | Facility: CLINIC | Age: 36
End: 2021-02-25

## 2021-04-06 ENCOUNTER — PROCEDURE VISIT (OUTPATIENT)
Dept: MATERNAL FETAL MEDICINE | Facility: CLINIC | Age: 36
End: 2021-04-06
Payer: MEDICAID

## 2021-04-06 VITALS — WEIGHT: 191 LBS | SYSTOLIC BLOOD PRESSURE: 124 MMHG | DIASTOLIC BLOOD PRESSURE: 60 MMHG | BODY MASS INDEX: 38.58 KG/M2

## 2021-04-06 DIAGNOSIS — O36.5932 POOR FETAL GROWTH AFFECTING MANAGEMENT OF MOTHER IN THIRD TRIMESTER, FETUS 2 OF MULTIPLE GESTATION: ICD-10-CM

## 2021-04-06 DIAGNOSIS — Z82.79 FAMILY HISTORY OF CONGENITAL ANOMALY: ICD-10-CM

## 2021-04-06 DIAGNOSIS — Z36.89 ENCOUNTER FOR ULTRASOUND TO ASSESS FETAL GROWTH: ICD-10-CM

## 2021-04-06 DIAGNOSIS — Z36.9 ENCOUNTER FOR FETAL ULTRASOUND: Primary | ICD-10-CM

## 2021-04-06 DIAGNOSIS — Z87.798 HISTORY OF CONGENITAL ANOMALY: ICD-10-CM

## 2021-04-06 DIAGNOSIS — O30.043 DICHORIONIC DIAMNIOTIC TWIN PREGNANCY IN THIRD TRIMESTER: ICD-10-CM

## 2021-04-06 DIAGNOSIS — O30.042 DICHORIONIC DIAMNIOTIC TWIN PREGNANCY IN SECOND TRIMESTER: ICD-10-CM

## 2021-04-06 PROCEDURE — 76816 OB US FOLLOW-UP PER FETUS: CPT | Mod: 59,,, | Performed by: OBSTETRICS & GYNECOLOGY

## 2021-04-06 PROCEDURE — 76820 UMBILICAL ARTERY ECHO: CPT | Mod: ,,, | Performed by: OBSTETRICS & GYNECOLOGY

## 2021-04-06 PROCEDURE — 76819 FETAL BIOPHYS PROFIL W/O NST: CPT | Mod: ,,, | Performed by: OBSTETRICS & GYNECOLOGY

## 2021-04-06 PROCEDURE — 76820 PR US, OB DOPPLER, FETAL UMBILICAL ARTERY ECHO: ICD-10-PCS | Mod: ,,, | Performed by: OBSTETRICS & GYNECOLOGY

## 2021-04-06 PROCEDURE — 76816 PR  US,PREGNANT UTERUS,F/U,TRANSABD APP: ICD-10-PCS | Mod: 59,,, | Performed by: OBSTETRICS & GYNECOLOGY

## 2021-04-06 PROCEDURE — 76819 PR US, OB, FETAL BIOPHYSICAL, W/O NST: ICD-10-PCS | Mod: 59,,, | Performed by: OBSTETRICS & GYNECOLOGY

## 2021-04-12 ENCOUNTER — TELEPHONE (OUTPATIENT)
Dept: MATERNAL FETAL MEDICINE | Facility: CLINIC | Age: 36
End: 2021-04-12

## 2021-04-19 ENCOUNTER — PROCEDURE VISIT (OUTPATIENT)
Dept: MATERNAL FETAL MEDICINE | Facility: CLINIC | Age: 36
End: 2021-04-19
Payer: MEDICAID

## 2021-04-19 ENCOUNTER — TELEPHONE (OUTPATIENT)
Dept: MATERNAL FETAL MEDICINE | Facility: CLINIC | Age: 36
End: 2021-04-19

## 2021-04-19 VITALS — SYSTOLIC BLOOD PRESSURE: 122 MMHG | DIASTOLIC BLOOD PRESSURE: 58 MMHG | WEIGHT: 192 LBS | BODY MASS INDEX: 38.78 KG/M2

## 2021-04-19 DIAGNOSIS — O30.042 DICHORIONIC DIAMNIOTIC TWIN PREGNANCY IN SECOND TRIMESTER: ICD-10-CM

## 2021-04-19 DIAGNOSIS — O30.043 DICHORIONIC DIAMNIOTIC TWIN PREGNANCY IN THIRD TRIMESTER: ICD-10-CM

## 2021-04-19 DIAGNOSIS — Z87.798 HISTORY OF CONGENITAL ANOMALY: ICD-10-CM

## 2021-04-19 DIAGNOSIS — Z36.9 ENCOUNTER FOR FETAL ULTRASOUND: ICD-10-CM

## 2021-04-19 PROCEDURE — 76820 UMBILICAL ARTERY ECHO: CPT | Mod: ,,, | Performed by: OBSTETRICS & GYNECOLOGY

## 2021-04-19 PROCEDURE — 76819 FETAL BIOPHYS PROFIL W/O NST: CPT | Mod: ,,, | Performed by: OBSTETRICS & GYNECOLOGY

## 2021-04-19 PROCEDURE — 76820 PR US, OB DOPPLER, FETAL UMBILICAL ARTERY ECHO: ICD-10-PCS | Mod: 59,,, | Performed by: OBSTETRICS & GYNECOLOGY

## 2021-04-19 PROCEDURE — 76819 PR US, OB, FETAL BIOPHYSICAL, W/O NST: ICD-10-PCS | Mod: ,,, | Performed by: OBSTETRICS & GYNECOLOGY

## 2021-04-26 ENCOUNTER — PROCEDURE VISIT (OUTPATIENT)
Dept: MATERNAL FETAL MEDICINE | Facility: CLINIC | Age: 36
End: 2021-04-26
Payer: MEDICAID

## 2021-04-26 VITALS — BODY MASS INDEX: 38.78 KG/M2 | WEIGHT: 192 LBS | SYSTOLIC BLOOD PRESSURE: 114 MMHG | DIASTOLIC BLOOD PRESSURE: 56 MMHG

## 2021-04-26 DIAGNOSIS — O36.5932 POOR FETAL GROWTH AFFECTING MANAGEMENT OF MOTHER IN THIRD TRIMESTER, FETUS 2 OF MULTIPLE GESTATION: Primary | ICD-10-CM

## 2021-04-26 DIAGNOSIS — O36.5932 POOR FETAL GROWTH AFFECTING MANAGEMENT OF MOTHER IN THIRD TRIMESTER, FETUS 2 OF MULTIPLE GESTATION: ICD-10-CM

## 2021-04-26 PROCEDURE — 76819 FETAL BIOPHYS PROFIL W/O NST: CPT | Mod: ,,, | Performed by: OBSTETRICS & GYNECOLOGY

## 2021-04-26 PROCEDURE — 76820 PR US, OB DOPPLER, FETAL UMBILICAL ARTERY ECHO: ICD-10-PCS | Mod: ,,, | Performed by: OBSTETRICS & GYNECOLOGY

## 2021-04-26 PROCEDURE — 76819 PR US, OB, FETAL BIOPHYSICAL, W/O NST: ICD-10-PCS | Mod: 59,,, | Performed by: OBSTETRICS & GYNECOLOGY

## 2021-04-26 PROCEDURE — 76820 UMBILICAL ARTERY ECHO: CPT | Mod: ,,, | Performed by: OBSTETRICS & GYNECOLOGY

## 2021-04-28 ENCOUNTER — PROCEDURE VISIT (OUTPATIENT)
Dept: MATERNAL FETAL MEDICINE | Facility: CLINIC | Age: 36
End: 2021-04-28
Payer: MEDICAID

## 2021-04-28 VITALS — DIASTOLIC BLOOD PRESSURE: 85 MMHG | SYSTOLIC BLOOD PRESSURE: 124 MMHG | BODY MASS INDEX: 38.78 KG/M2 | WEIGHT: 192 LBS

## 2021-04-28 DIAGNOSIS — O36.5932 IUGR (INTRAUTERINE GROWTH RESTRICTION) AFFECTING CARE OF MOTHER, THIRD TRIMESTER, FETUS 2: Primary | ICD-10-CM

## 2021-04-28 DIAGNOSIS — O36.5932 IUGR (INTRAUTERINE GROWTH RESTRICTION) AFFECTING CARE OF MOTHER, THIRD TRIMESTER, FETUS 2: ICD-10-CM

## 2021-04-28 DIAGNOSIS — Z36.89 ENCOUNTER FOR ULTRASOUND TO ASSESS FETAL GROWTH: Primary | ICD-10-CM

## 2021-04-28 PROCEDURE — 99212 OFFICE O/P EST SF 10 MIN: CPT | Mod: TH,25,, | Performed by: OBSTETRICS & GYNECOLOGY

## 2021-04-28 PROCEDURE — 76816 PR  US,PREGNANT UTERUS,F/U,TRANSABD APP: ICD-10-PCS | Mod: ,,, | Performed by: OBSTETRICS & GYNECOLOGY

## 2021-04-28 PROCEDURE — 76816 OB US FOLLOW-UP PER FETUS: CPT | Mod: ,,, | Performed by: OBSTETRICS & GYNECOLOGY

## 2021-04-28 PROCEDURE — 99212 PR OFFICE/OUTPT VISIT, EST, LEVL II, 10-19 MIN: ICD-10-PCS | Mod: TH,25,, | Performed by: OBSTETRICS & GYNECOLOGY

## 2021-04-28 PROCEDURE — 76820 PR US, OB DOPPLER, FETAL UMBILICAL ARTERY ECHO: ICD-10-PCS | Mod: ,,, | Performed by: OBSTETRICS & GYNECOLOGY

## 2021-04-28 PROCEDURE — 76819 PR US, OB, FETAL BIOPHYSICAL, W/O NST: ICD-10-PCS | Mod: ,,, | Performed by: OBSTETRICS & GYNECOLOGY

## 2021-04-28 PROCEDURE — 76819 FETAL BIOPHYS PROFIL W/O NST: CPT | Mod: ,,, | Performed by: OBSTETRICS & GYNECOLOGY

## 2021-04-28 PROCEDURE — 76820 UMBILICAL ARTERY ECHO: CPT | Mod: ,,, | Performed by: OBSTETRICS & GYNECOLOGY

## 2021-05-06 ENCOUNTER — CLINICAL SUPPORT (OUTPATIENT)
Dept: PEDIATRIC CARDIOLOGY | Facility: CLINIC | Age: 36
End: 2021-05-06
Payer: MEDICAID

## 2021-05-06 ENCOUNTER — OFFICE VISIT (OUTPATIENT)
Dept: PEDIATRIC CARDIOLOGY | Facility: CLINIC | Age: 36
End: 2021-05-06
Payer: MEDICAID

## 2021-05-06 VITALS
HEIGHT: 59 IN | BODY MASS INDEX: 39.69 KG/M2 | DIASTOLIC BLOOD PRESSURE: 73 MMHG | WEIGHT: 196.88 LBS | SYSTOLIC BLOOD PRESSURE: 122 MMHG

## 2021-05-06 DIAGNOSIS — Z03.73 SUSPECTED FETAL ANOMALY NOT FOUND: ICD-10-CM

## 2021-05-06 DIAGNOSIS — O30.042 DICHORIONIC DIAMNIOTIC TWIN PREGNANCY IN SECOND TRIMESTER: ICD-10-CM

## 2021-05-06 DIAGNOSIS — O36.5932 IUGR (INTRAUTERINE GROWTH RESTRICTION) AFFECTING CARE OF MOTHER, THIRD TRIMESTER, FETUS 2: Primary | ICD-10-CM

## 2021-05-06 PROCEDURE — 99999 PR PBB SHADOW E&M-EST. PATIENT-LVL III: ICD-10-PCS | Mod: PBBFAC,,, | Performed by: PEDIATRICS

## 2021-05-06 PROCEDURE — 76825 ECHO EXAM OF FETAL HEART: CPT | Mod: PBBFAC | Performed by: PEDIATRICS

## 2021-05-06 PROCEDURE — 76827 ECHO EXAM OF FETAL HEART: CPT | Mod: 26,S$PBB,, | Performed by: PEDIATRICS

## 2021-05-06 PROCEDURE — 93325 DOPPLER ECHO COLOR FLOW MAPG: CPT | Mod: 26,59,S$PBB, | Performed by: PEDIATRICS

## 2021-05-06 PROCEDURE — 93325 DOPPLER ECHO COLOR FLOW MAPG: CPT | Mod: 26,S$PBB,, | Performed by: PEDIATRICS

## 2021-05-06 PROCEDURE — 93325 PR DOPPLER COLOR FLOW VELOCITY MAP: ICD-10-PCS | Mod: 26,59,S$PBB, | Performed by: PEDIATRICS

## 2021-05-06 PROCEDURE — 99999 PR PBB SHADOW E&M-EST. PATIENT-LVL III: CPT | Mod: PBBFAC,,, | Performed by: PEDIATRICS

## 2021-05-06 PROCEDURE — 76827 PR  SO2 FETAL HEART DOPPLER: ICD-10-PCS | Mod: 26,S$PBB,, | Performed by: PEDIATRICS

## 2021-05-06 PROCEDURE — 76825 PR  SO2 FETAL HEART: ICD-10-PCS | Mod: 26,S$PBB,, | Performed by: PEDIATRICS

## 2021-05-06 PROCEDURE — 76827 ECHO EXAM OF FETAL HEART: CPT | Mod: PBBFAC | Performed by: PEDIATRICS

## 2021-05-06 PROCEDURE — 76827 ECHO EXAM OF FETAL HEART: CPT | Mod: 26,59,S$PBB, | Performed by: PEDIATRICS

## 2021-05-06 PROCEDURE — 76825 ECHO EXAM OF FETAL HEART: CPT | Mod: 26,S$PBB,, | Performed by: PEDIATRICS

## 2021-05-06 PROCEDURE — 93325 DOPPLER ECHO COLOR FLOW MAPG: CPT | Mod: PBBFAC | Performed by: PEDIATRICS

## 2021-05-06 PROCEDURE — 99203 OFFICE O/P NEW LOW 30 MIN: CPT | Mod: 25,S$PBB,, | Performed by: PEDIATRICS

## 2021-05-06 PROCEDURE — 76825 ECHO EXAM OF FETAL HEART: CPT | Mod: 26,59,S$PBB, | Performed by: PEDIATRICS

## 2021-05-06 PROCEDURE — 76827 PR  SO2 FETAL HEART DOPPLER: ICD-10-PCS | Mod: 26,59,S$PBB, | Performed by: PEDIATRICS

## 2021-05-06 PROCEDURE — 93325 PR DOPPLER COLOR FLOW VELOCITY MAP: ICD-10-PCS | Mod: 26,S$PBB,, | Performed by: PEDIATRICS

## 2021-05-06 PROCEDURE — 76825 PR  SO2 FETAL HEART: ICD-10-PCS | Mod: 26,59,S$PBB, | Performed by: PEDIATRICS

## 2021-05-06 PROCEDURE — 99213 OFFICE O/P EST LOW 20 MIN: CPT | Mod: PBBFAC,25 | Performed by: PEDIATRICS

## 2021-05-06 PROCEDURE — 99203 PR OFFICE/OUTPT VISIT, NEW, LEVL III, 30-44 MIN: ICD-10-PCS | Mod: 25,S$PBB,, | Performed by: PEDIATRICS

## 2021-05-06 RX ORDER — CETIRIZINE HYDROCHLORIDE 10 MG/1
10 TABLET ORAL DAILY
COMMUNITY

## 2021-05-06 RX ORDER — FERROUS SULFATE 325(65) MG
325 TABLET ORAL
COMMUNITY

## 2021-05-11 ENCOUNTER — PROCEDURE VISIT (OUTPATIENT)
Dept: MATERNAL FETAL MEDICINE | Facility: CLINIC | Age: 36
End: 2021-05-11
Payer: MEDICAID

## 2021-05-11 VITALS — BODY MASS INDEX: 39.57 KG/M2 | DIASTOLIC BLOOD PRESSURE: 60 MMHG | WEIGHT: 196 LBS | SYSTOLIC BLOOD PRESSURE: 122 MMHG

## 2021-05-11 DIAGNOSIS — O36.5932 POOR FETAL GROWTH AFFECTING MANAGEMENT OF MOTHER IN THIRD TRIMESTER, FETUS 2 OF MULTIPLE GESTATION: ICD-10-CM

## 2021-05-11 PROCEDURE — 76819 PR US, OB, FETAL BIOPHYSICAL, W/O NST: ICD-10-PCS | Mod: ,,, | Performed by: OBSTETRICS & GYNECOLOGY

## 2021-05-11 PROCEDURE — 76820 PR US, OB DOPPLER, FETAL UMBILICAL ARTERY ECHO: ICD-10-PCS | Mod: ,,, | Performed by: OBSTETRICS & GYNECOLOGY

## 2021-05-11 PROCEDURE — 76820 UMBILICAL ARTERY ECHO: CPT | Mod: ,,, | Performed by: OBSTETRICS & GYNECOLOGY

## 2021-05-11 PROCEDURE — 76819 FETAL BIOPHYS PROFIL W/O NST: CPT | Mod: ,,, | Performed by: OBSTETRICS & GYNECOLOGY

## 2021-05-13 ENCOUNTER — HOSPITAL ENCOUNTER (EMERGENCY)
Facility: OTHER | Age: 36
Discharge: HOME | End: 2021-05-13
Attending: OBSTETRICS & GYNECOLOGY
Payer: MEDICAID

## 2021-05-13 VITALS
SYSTOLIC BLOOD PRESSURE: 116 MMHG | HEART RATE: 79 BPM | OXYGEN SATURATION: 96 % | TEMPERATURE: 99 F | RESPIRATION RATE: 18 BRPM | DIASTOLIC BLOOD PRESSURE: 77 MMHG

## 2021-05-13 DIAGNOSIS — Z36.89 NST (NON-STRESS TEST) REACTIVE: Primary | ICD-10-CM

## 2021-05-13 PROCEDURE — 99283 PR EMERGENCY DEPT VISIT,LEVEL III: ICD-10-PCS | Mod: 25,,, | Performed by: OBSTETRICS & GYNECOLOGY

## 2021-05-13 PROCEDURE — 59025 PR FETAL 2N-STRESS TEST: ICD-10-PCS | Mod: 26,,, | Performed by: OBSTETRICS & GYNECOLOGY

## 2021-05-13 PROCEDURE — 99284 EMERGENCY DEPT VISIT MOD MDM: CPT | Mod: 25

## 2021-05-13 PROCEDURE — 59025 FETAL NON-STRESS TEST: CPT

## 2021-05-13 PROCEDURE — 99283 EMERGENCY DEPT VISIT LOW MDM: CPT | Mod: 25,,, | Performed by: OBSTETRICS & GYNECOLOGY

## 2021-05-13 PROCEDURE — 59025 FETAL NON-STRESS TEST: CPT | Mod: 26,,, | Performed by: OBSTETRICS & GYNECOLOGY

## 2021-05-17 ENCOUNTER — TELEPHONE (OUTPATIENT)
Dept: MATERNAL FETAL MEDICINE | Facility: CLINIC | Age: 36
End: 2021-05-17

## 2021-05-17 DIAGNOSIS — Z36.9 ENCOUNTER FOR FETAL ULTRASOUND: Primary | ICD-10-CM

## 2021-05-17 DIAGNOSIS — O36.5990 PREGNANCY AFFECTED BY FETAL GROWTH RESTRICTION: ICD-10-CM

## 2021-05-18 ENCOUNTER — TELEPHONE (OUTPATIENT)
Dept: MATERNAL FETAL MEDICINE | Facility: CLINIC | Age: 36
End: 2021-05-18

## 2021-05-24 ENCOUNTER — PROCEDURE VISIT (OUTPATIENT)
Dept: MATERNAL FETAL MEDICINE | Facility: CLINIC | Age: 36
End: 2021-05-24
Payer: MEDICAID

## 2021-05-24 VITALS — SYSTOLIC BLOOD PRESSURE: 128 MMHG | DIASTOLIC BLOOD PRESSURE: 76 MMHG | WEIGHT: 199.5 LBS | BODY MASS INDEX: 40.27 KG/M2

## 2021-05-24 DIAGNOSIS — O36.5932 IUGR (INTRAUTERINE GROWTH RESTRICTION) AFFECTING CARE OF MOTHER, THIRD TRIMESTER, FETUS 2: ICD-10-CM

## 2021-05-24 DIAGNOSIS — O36.5932 POOR FETAL GROWTH AFFECTING MANAGEMENT OF MOTHER IN THIRD TRIMESTER, FETUS 2 OF MULTIPLE GESTATION: ICD-10-CM

## 2021-05-24 PROCEDURE — 76816 OB US FOLLOW-UP PER FETUS: CPT | Mod: ,,, | Performed by: OBSTETRICS & GYNECOLOGY

## 2021-05-24 PROCEDURE — 76820 PR US, OB DOPPLER, FETAL UMBILICAL ARTERY ECHO: ICD-10-PCS | Mod: ,,, | Performed by: OBSTETRICS & GYNECOLOGY

## 2021-05-24 PROCEDURE — 76819 PR US, OB, FETAL BIOPHYSICAL, W/O NST: ICD-10-PCS | Mod: ,,, | Performed by: OBSTETRICS & GYNECOLOGY

## 2021-05-24 PROCEDURE — 76819 FETAL BIOPHYS PROFIL W/O NST: CPT | Mod: ,,, | Performed by: OBSTETRICS & GYNECOLOGY

## 2021-05-24 PROCEDURE — 76820 UMBILICAL ARTERY ECHO: CPT | Mod: ,,, | Performed by: OBSTETRICS & GYNECOLOGY

## 2021-05-24 PROCEDURE — 76816 PR  US,PREGNANT UTERUS,F/U,TRANSABD APP: ICD-10-PCS | Mod: ,,, | Performed by: OBSTETRICS & GYNECOLOGY

## 2021-05-24 RX ORDER — CEPHALEXIN 500 MG/1
500 CAPSULE ORAL 4 TIMES DAILY
COMMUNITY
Start: 2021-05-20

## 2021-05-31 ENCOUNTER — PROCEDURE VISIT (OUTPATIENT)
Dept: MATERNAL FETAL MEDICINE | Facility: CLINIC | Age: 36
End: 2021-05-31
Payer: MEDICAID

## 2021-05-31 VITALS — WEIGHT: 201 LBS | BODY MASS INDEX: 40.58 KG/M2 | DIASTOLIC BLOOD PRESSURE: 75 MMHG | SYSTOLIC BLOOD PRESSURE: 129 MMHG

## 2021-05-31 DIAGNOSIS — O36.5932 POOR FETAL GROWTH AFFECTING MANAGEMENT OF MOTHER IN THIRD TRIMESTER, FETUS 2 OF MULTIPLE GESTATION: ICD-10-CM

## 2021-05-31 DIAGNOSIS — O36.5932 IUGR (INTRAUTERINE GROWTH RESTRICTION) AFFECTING CARE OF MOTHER, THIRD TRIMESTER, FETUS 2: ICD-10-CM

## 2021-05-31 PROCEDURE — 76819 PR US, OB, FETAL BIOPHYSICAL, W/O NST: ICD-10-PCS | Mod: 59,,, | Performed by: OBSTETRICS & GYNECOLOGY

## 2021-05-31 PROCEDURE — 76820 PR US, OB DOPPLER, FETAL UMBILICAL ARTERY ECHO: ICD-10-PCS | Mod: 59,,, | Performed by: OBSTETRICS & GYNECOLOGY

## 2021-05-31 PROCEDURE — 76820 UMBILICAL ARTERY ECHO: CPT | Mod: ,,, | Performed by: OBSTETRICS & GYNECOLOGY

## 2021-05-31 PROCEDURE — 76819 FETAL BIOPHYS PROFIL W/O NST: CPT | Mod: ,,, | Performed by: OBSTETRICS & GYNECOLOGY

## 2023-09-26 ENCOUNTER — TELEPHONE (OUTPATIENT)
Dept: FAMILY MEDICINE | Facility: CLINIC | Age: 38
End: 2023-09-26
Payer: MEDICAID

## 2023-09-26 NOTE — TELEPHONE ENCOUNTER
----- Message from Caity Ward sent at 9/25/2023  4:39 PM CDT -----  Contact: PT  Type:  Sooner Appointment Request    Caller is requesting a sooner appointment.  Caller declined first available appointment listed below.  Caller will not accept being placed on the waitlist and is requesting a message be sent to doctor.    Name of Caller:  PT  When is the first available appointment?  No solutions found  Symptoms:   Establish Care  Best Call Back Number:  [phone]  Additional Information:  New pt has Medicaid asking to be seen tmrw 9/26/23 if possible

## (undated) DEVICE — VAC WOUND DISPOSABLE PREVENA